# Patient Record
Sex: FEMALE | Race: WHITE | Employment: PART TIME | URBAN - METROPOLITAN AREA
[De-identification: names, ages, dates, MRNs, and addresses within clinical notes are randomized per-mention and may not be internally consistent; named-entity substitution may affect disease eponyms.]

---

## 2023-04-21 DIAGNOSIS — N91.2 AMENORRHEA: Primary | ICD-10-CM

## 2023-05-02 ENCOUNTER — INITIAL PRENATAL (OUTPATIENT)
Dept: OBGYN CLINIC | Age: 33
End: 2023-05-02
Payer: COMMERCIAL

## 2023-05-02 ENCOUNTER — ANCILLARY PROCEDURE (OUTPATIENT)
Dept: OBGYN CLINIC | Age: 33
End: 2023-05-02
Payer: COMMERCIAL

## 2023-05-02 VITALS — HEART RATE: 91 BPM | DIASTOLIC BLOOD PRESSURE: 85 MMHG | WEIGHT: 236 LBS | SYSTOLIC BLOOD PRESSURE: 121 MMHG

## 2023-05-02 DIAGNOSIS — N91.4 SECONDARY OLIGOMENORRHEA: Primary | ICD-10-CM

## 2023-05-02 PROCEDURE — 99203 OFFICE O/P NEW LOW 30 MIN: CPT | Performed by: OBSTETRICS & GYNECOLOGY

## 2023-05-02 PROCEDURE — 76801 OB US < 14 WKS SINGLE FETUS: CPT | Performed by: OBSTETRICS & GYNECOLOGY

## 2023-05-04 ENCOUNTER — INITIAL PRENATAL (OUTPATIENT)
Dept: OBGYN CLINIC | Age: 33
End: 2023-05-04
Payer: COMMERCIAL

## 2023-05-04 VITALS — HEART RATE: 99 BPM | DIASTOLIC BLOOD PRESSURE: 80 MMHG | WEIGHT: 237 LBS | SYSTOLIC BLOOD PRESSURE: 114 MMHG

## 2023-05-04 DIAGNOSIS — O09.91 SUPERVISION OF HIGH RISK PREGNANCY IN FIRST TRIMESTER: Primary | ICD-10-CM

## 2023-05-04 DIAGNOSIS — O09.91 SUPERVISION OF HIGH RISK PREGNANCY IN FIRST TRIMESTER: ICD-10-CM

## 2023-05-04 PROBLEM — E66.9 OBESITY: Status: ACTIVE | Noted: 2022-01-15

## 2023-05-04 PROBLEM — O34.219 HISTORY OF CESAREAN DELIVERY, CURRENTLY PREGNANT: Status: ACTIVE | Noted: 2018-11-20

## 2023-05-04 PROBLEM — E05.00 GRAVES' DISEASE: Status: ACTIVE | Noted: 2022-01-25

## 2023-05-04 LAB
GLUCOSE URINE, POC: NEGATIVE
PROTEIN UA: NEGATIVE

## 2023-05-04 PROCEDURE — 81002 URINALYSIS NONAUTO W/O SCOPE: CPT | Performed by: OBSTETRICS & GYNECOLOGY

## 2023-05-04 PROCEDURE — 99203 OFFICE O/P NEW LOW 30 MIN: CPT | Performed by: OBSTETRICS & GYNECOLOGY

## 2023-05-04 PROCEDURE — 0500F INITIAL PRENATAL CARE VISIT: CPT | Performed by: OBSTETRICS & GYNECOLOGY

## 2023-05-04 NOTE — PATIENT INSTRUCTIONS
Please arrive for your scheduled appointment at least 15 minutes early with your actual insurance card+ a photo ID. Also if you need any refills ordered or have questions, it may take up 48 hours to reply. Please allow ample time for your refills. Call me when you use last refill. Thank you for your cooperation. You might be having an NST at your next appt. Please eat a large snack or breakfast before coming to office. Thank you Call your primary obstetrician with bleeding, leaking of fluid, abdominal tenderness, headache, blurry vision, epigastric pain and increased urinary frequency. If you are experiencing an emergency and need immediate help, call 911 or go to go emergency room or labor and delivery. If you are experiencing an emergency and need immediate help, call 911 or go to go emergency room or labor and delivery.

## 2023-05-04 NOTE — PROGRESS NOTES
Nikita Corral is a 77-year-old G2, P2 female with 2 previous C-sections for LGA and gestational diabetes. Recently treated for hypothyroidism has been off meds for 4 months thyroid levels have been normal.  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 FOB is involved and she feels safe with him. Up-to-date on her Pap. Reports gestational hypertension at the end of pregnancy. Questions whether or not she can have a vaginal delivery. Declined NIPT  Declined nuchal  Initial prenatal labs with TSH level. And GTT. Low-dose aspirin at 12 weeks  20-week consult with MFM  Reviewed 2 previous C-sections would recommend repeat  Reviewed first and second trimester changes  Meds, nausea, foods and warning sign brochure given.

## 2023-05-04 NOTE — PROGRESS NOTES
Initial ob  No fm    Pt denies lof vag bleeding or contractions  Pt had last pap 1/2023 through [facility].

## 2023-05-08 LAB
C TRACH DNA SPEC QL NAA+PROBE: NEGATIVE
N GONORRHOEA DNA SPEC QL NAA+PROBE: NEGATIVE
SPECIMEN SOURCE: NORMAL

## 2023-06-08 ENCOUNTER — ROUTINE PRENATAL (OUTPATIENT)
Dept: OBGYN CLINIC | Age: 33
End: 2023-06-08
Payer: COMMERCIAL

## 2023-06-08 VITALS
WEIGHT: 245.2 LBS | DIASTOLIC BLOOD PRESSURE: 91 MMHG | BODY MASS INDEX: 40.85 KG/M2 | HEIGHT: 65 IN | HEART RATE: 109 BPM | SYSTOLIC BLOOD PRESSURE: 136 MMHG

## 2023-06-08 DIAGNOSIS — O09.522 SUPERVISION OF HIGH RISK MULTIGRAVIDA IN SECOND TRIMESTER IN PATIENT 35 YEARS OR OLDER AT TIME OF DELIVERY: Primary | ICD-10-CM

## 2023-06-08 DIAGNOSIS — R03.0 ELEVATED BP WITHOUT DIAGNOSIS OF HYPERTENSION: ICD-10-CM

## 2023-06-08 LAB
GLUCOSE URINE, POC: NEGATIVE
PROTEIN UA: NEGATIVE

## 2023-06-08 PROCEDURE — 81002 URINALYSIS NONAUTO W/O SCOPE: CPT | Performed by: OBSTETRICS & GYNECOLOGY

## 2023-06-08 PROCEDURE — 99213 OFFICE O/P EST LOW 20 MIN: CPT | Performed by: OBSTETRICS & GYNECOLOGY

## 2023-06-08 RX ORDER — ALBUTEROL SULFATE 90 UG/1
AEROSOL, METERED RESPIRATORY (INHALATION)
COMMUNITY

## 2023-06-08 NOTE — PROGRESS NOTES
Patient alert and pleasant with no complaints. Here today for prenatal visit. Urine for glucose and protein obtained with negative results. Discharge instructions have been discussed with the patient. Patient advised to call our office with any questions or concerns. Voiced understanding.

## 2023-06-08 NOTE — PROGRESS NOTES
Did not get prenatal labs done yet. Will be getting them at [facility] will need to have them faxed. Quad screen ordered. Appointment with M. For history gestational diabetes and hypertension 2 previous C-sections. Initial PIH labs ordered as well. Reviewed second trimester changes.

## 2023-06-28 DIAGNOSIS — O24.419 GESTATIONAL DIABETES MELLITUS (GDM) IN SECOND TRIMESTER, GESTATIONAL DIABETES METHOD OF CONTROL UNSPECIFIED: Primary | ICD-10-CM

## 2023-06-28 RX ORDER — LANCETS 30 GAUGE
1 EACH MISCELLANEOUS 4 TIMES DAILY
Qty: 200 EACH | Refills: 0 | Status: SHIPPED | OUTPATIENT
Start: 2023-06-28

## 2023-06-28 RX ORDER — GLUCOSAMINE HCL/CHONDROITIN SU 500-400 MG
CAPSULE ORAL
Qty: 200 STRIP | Refills: 3 | Status: SHIPPED | OUTPATIENT
Start: 2023-06-28

## 2023-06-28 RX ORDER — BLOOD-GLUCOSE METER
1 KIT MISCELLANEOUS DAILY
Qty: 1 KIT | Refills: 0 | Status: SHIPPED | OUTPATIENT
Start: 2023-06-28

## 2023-07-05 ENCOUNTER — ANCILLARY PROCEDURE (OUTPATIENT)
Dept: OBGYN CLINIC | Age: 33
End: 2023-07-05
Payer: COMMERCIAL

## 2023-07-05 ENCOUNTER — ROUTINE PRENATAL (OUTPATIENT)
Dept: OBGYN CLINIC | Age: 33
End: 2023-07-05
Payer: COMMERCIAL

## 2023-07-05 VITALS
BODY MASS INDEX: 41.07 KG/M2 | DIASTOLIC BLOOD PRESSURE: 80 MMHG | SYSTOLIC BLOOD PRESSURE: 115 MMHG | WEIGHT: 243 LBS | HEART RATE: 99 BPM

## 2023-07-05 DIAGNOSIS — R03.0 ELEVATED BP WITHOUT DIAGNOSIS OF HYPERTENSION: ICD-10-CM

## 2023-07-05 DIAGNOSIS — O09.522 SUPERVISION OF HIGH RISK MULTIGRAVIDA IN SECOND TRIMESTER IN PATIENT 35 YEARS OR OLDER AT TIME OF DELIVERY: ICD-10-CM

## 2023-07-05 DIAGNOSIS — Z34.90 FOURTH PREGNANCY: ICD-10-CM

## 2023-07-05 DIAGNOSIS — O24.410 DIET CONTROLLED GESTATIONAL DIABETES MELLITUS (GDM) IN SECOND TRIMESTER: ICD-10-CM

## 2023-07-05 DIAGNOSIS — Z3A.19 19 WEEKS GESTATION OF PREGNANCY: Primary | ICD-10-CM

## 2023-07-05 LAB
GLUCOSE URINE, POC: NORMAL
PROTEIN UA: NEGATIVE

## 2023-07-05 PROCEDURE — 99213 OFFICE O/P EST LOW 20 MIN: CPT | Performed by: OBSTETRICS & GYNECOLOGY

## 2023-07-05 PROCEDURE — 81002 URINALYSIS NONAUTO W/O SCOPE: CPT | Performed by: OBSTETRICS & GYNECOLOGY

## 2023-07-05 PROCEDURE — 99999 PR OFFICE/OUTPT VISIT,PROCEDURE ONLY: CPT | Performed by: OBSTETRICS & GYNECOLOGY

## 2023-07-05 PROCEDURE — 76811 OB US DETAILED SNGL FETUS: CPT | Performed by: OBSTETRICS & GYNECOLOGY

## 2023-07-05 RX ORDER — GLUCOSAMINE HCL/CHONDROITIN SU 500-400 MG
CAPSULE ORAL
Qty: 120 STRIP | Refills: 4 | Status: SHIPPED | OUTPATIENT
Start: 2023-07-05

## 2023-07-05 RX ORDER — BLOOD-GLUCOSE METER
1 KIT MISCELLANEOUS 4 TIMES DAILY
Qty: 1 KIT | Refills: 0 | Status: SHIPPED | OUTPATIENT
Start: 2023-07-05

## 2023-07-05 RX ORDER — SYRING-NEEDL,DISP,INSUL,0.3 ML 30 GX5/16"
120 SYRINGE, EMPTY DISPOSABLE MISCELLANEOUS 4 TIMES DAILY
Qty: 100 EACH | Refills: 4 | Status: SHIPPED | OUTPATIENT
Start: 2023-07-05

## 2023-07-06 ENCOUNTER — ROUTINE PRENATAL (OUTPATIENT)
Dept: OBGYN CLINIC | Age: 33
End: 2023-07-06
Payer: COMMERCIAL

## 2023-07-06 VITALS
HEART RATE: 100 BPM | SYSTOLIC BLOOD PRESSURE: 125 MMHG | WEIGHT: 242 LBS | DIASTOLIC BLOOD PRESSURE: 84 MMHG | BODY MASS INDEX: 40.9 KG/M2

## 2023-07-06 DIAGNOSIS — O09.92 ENCOUNTER FOR SUPERVISION OF HIGH RISK PREGNANCY IN SECOND TRIMESTER, ANTEPARTUM: Primary | ICD-10-CM

## 2023-07-06 LAB
GLUCOSE URINE, POC: NEGATIVE
PROTEIN UA: NEGATIVE

## 2023-07-06 PROCEDURE — 81002 URINALYSIS NONAUTO W/O SCOPE: CPT | Performed by: OBSTETRICS & GYNECOLOGY

## 2023-07-06 PROCEDURE — 99213 OFFICE O/P EST LOW 20 MIN: CPT | Performed by: OBSTETRICS & GYNECOLOGY

## 2023-07-06 NOTE — PROGRESS NOTES
Has not met yet with diabetic education but she has increased her complex carbohydrates decrease the simple carbohydrates and increase her protein. Picking up her supplies today. Saw MFM. They will continue to follow. Reports positive quickening. Labs reviewed. Plan delivery approximately 39 weeks repeat .   Will need to check TSH and T4 with 26/28-week labs secondary to history of hyperthyroidism

## 2023-07-24 ENCOUNTER — ROUTINE PRENATAL (OUTPATIENT)
Dept: OBGYN CLINIC | Age: 33
End: 2023-07-24
Payer: COMMERCIAL

## 2023-07-24 VITALS
HEART RATE: 93 BPM | SYSTOLIC BLOOD PRESSURE: 130 MMHG | BODY MASS INDEX: 41.74 KG/M2 | WEIGHT: 247 LBS | DIASTOLIC BLOOD PRESSURE: 84 MMHG

## 2023-07-24 DIAGNOSIS — O09.92 ENCOUNTER FOR SUPERVISION OF HIGH RISK PREGNANCY IN SECOND TRIMESTER, ANTEPARTUM: Primary | ICD-10-CM

## 2023-07-24 DIAGNOSIS — R03.0 ELEVATED BP WITHOUT DIAGNOSIS OF HYPERTENSION: ICD-10-CM

## 2023-07-24 DIAGNOSIS — Z3A.22 22 WEEKS GESTATION OF PREGNANCY: ICD-10-CM

## 2023-07-24 LAB
GLUCOSE URINE, POC: NEGATIVE
PROTEIN UA: POSITIVE

## 2023-07-24 PROCEDURE — 81002 URINALYSIS NONAUTO W/O SCOPE: CPT | Performed by: OBSTETRICS & GYNECOLOGY

## 2023-07-24 PROCEDURE — G8427 DOCREV CUR MEDS BY ELIG CLIN: HCPCS | Performed by: OBSTETRICS & GYNECOLOGY

## 2023-07-24 PROCEDURE — 1036F TOBACCO NON-USER: CPT | Performed by: OBSTETRICS & GYNECOLOGY

## 2023-07-24 PROCEDURE — G8419 CALC BMI OUT NRM PARAM NOF/U: HCPCS | Performed by: OBSTETRICS & GYNECOLOGY

## 2023-07-24 PROCEDURE — 99213 OFFICE O/P EST LOW 20 MIN: CPT | Performed by: OBSTETRICS & GYNECOLOGY

## 2023-07-24 PROCEDURE — 99212 OFFICE O/P EST SF 10 MIN: CPT | Performed by: OBSTETRICS & GYNECOLOGY

## 2023-07-24 NOTE — PROGRESS NOTES
Pt here today to review sugars  Pt has no other concerns  Good fetal movement  Pt has not been logging sugars. She stated that she didn't know she had to.

## 2023-07-24 NOTE — PROGRESS NOTES
[address] [place] [address] FETAL MEDICINE  [address]  [place] [address]  Dept: [phone]  Loc: [phone]     2023    RE:  Iram Moore     : 1990   AGE: 26 y.o. Dear Dr. Mady Gant,    Thank you for allowing me to provide prenatal consultation for Iram Moore. As I'm sure you will recall, Iram Moore is a 26 y.o. [id number]  Patient's last menstrual period was 2023. Estimated Date of Delivery: 23 at 22w3d seen in our office today for the following:    REASON FOR CONSULTATION:  Diabetic management    Patient Active Problem List    Diagnosis Date Noted    22 weeks gestation of pregnancy 2023    Fourth pregnancy 2023    Diet controlled gestational diabetes mellitus (GDM) in second trimester 2023    Graves' disease 2022    Obesity 01/15/2022    History of  delivery, currently pregnant 2018       PAST HISTORY:  OB History    Para Term  AB Living   4 2 2   1 2   SAB IAB Ectopic Molar Multiple Live Births       1     2      # Outcome Date GA Lbr Ben/2nd Weight Sex Delivery Anes PTL Lv   4 Current            3 Term 19 37w0d  10 lb 15 oz (4.961 kg) M CS-LTranv Spinal  JUAN JOSE   2 Term 11 39w1d  10 lb 3 oz (4.621 kg) F CS-Unspec Spinal  JUAN JOSE   1 Ectopic 08    U              MEDICAL:  Past Medical History:   Diagnosis Date    History of hyperthyroidism     History of pregnancy induced hypertension     hx ofGestational diabetes     Hypertension     Hyperthyroidism     Overactive bladder         SURGICAL:  Past Surgical History:   Procedure Laterality Date     SECTION      X2    DILATION AND CURETTAGE      ECTOPIC PREGNANCY SURGERY         ALLERGIES:    Allergies   Allergen Reactions    Bee Venom Swelling     Severe localized swelling. Severe localized swelling. Gluten     Hornet Venom Swelling     Severe localized swelling.

## 2023-07-24 NOTE — PATIENT INSTRUCTIONS
Please arrive for your scheduled appointment at least 15 minutes early with your actual insurance card+ a photo ID. Also if you need any refills ordered or have questions, it may take up 48 hours to reply. Please allow ample time for your refills. Call me when you use last refill. Thank you for your cooperation. You might be having an NST at your next appt. Please eat a large snack or breakfast before coming to office. Thank you If you are experiencing an emergency and need immediate help, call 911 or go to go emergency room or labor and delivery. if you are sick, not feeling well or have an infectious process going on please reschedule your appointment by calling [phone]. Also if any family members are not feeling well, please do not bring them to your appointment. We appreciate your cooperation. We are doing this in order to protect our pregnant mothers+ their babies. if you are sick, not feeling well or have an infectious process going on please reschedule your appointment by calling [phone]. Also if any family members are not feeling well, please do not bring them to your appointment. We appreciate your cooperation. We are doing this in order to protect our pregnant mothers+ their babies.

## 2023-08-01 ENCOUNTER — TELEPHONE (OUTPATIENT)
Dept: OBGYN | Age: 33
End: 2023-08-01

## 2023-08-01 NOTE — TELEPHONE ENCOUNTER
----- Message from Torin Tariq DO sent at 2023  1:13 PM EDT -----  Regarding: Diabetic education  Can you please let her know that it is extremely important that she has an appointment with diabetic education.   I received a message saying she canceled her appointment and did not want to reschedule at her last visit we reviewed the risks of uncontrolled gestational diabetes including stillbirth which babies and excessive fluid which can lead to  labor  ----- Message -----  From: Ashley De La Rosa RD, LD  Sent: 2023  12:19 PM EDT  To: Torin Tariq DO  Subject: Diabetes Management Program

## 2023-08-01 NOTE — TELEPHONE ENCOUNTER
Called pt to inform her of dr. Kumar Ann concerns if she does not rs her appointment with diabetic education. Lvm for pt to call our office.

## 2023-08-02 ENCOUNTER — ROUTINE PRENATAL (OUTPATIENT)
Dept: OBGYN CLINIC | Age: 33
End: 2023-08-02
Payer: COMMERCIAL

## 2023-08-02 ENCOUNTER — ANCILLARY PROCEDURE (OUTPATIENT)
Dept: OBGYN CLINIC | Age: 33
End: 2023-08-02
Payer: COMMERCIAL

## 2023-08-02 VITALS
WEIGHT: 250.44 LBS | SYSTOLIC BLOOD PRESSURE: 116 MMHG | BODY MASS INDEX: 42.32 KG/M2 | HEART RATE: 91 BPM | DIASTOLIC BLOOD PRESSURE: 80 MMHG

## 2023-08-02 DIAGNOSIS — O24.410 DIET CONTROLLED GESTATIONAL DIABETES MELLITUS (GDM) IN SECOND TRIMESTER: Primary | ICD-10-CM

## 2023-08-02 DIAGNOSIS — Z3A.25 25 WEEKS GESTATION OF PREGNANCY: ICD-10-CM

## 2023-08-02 LAB
GLUCOSE URINE, POC: NEGATIVE
PROTEIN UA: NEGATIVE

## 2023-08-02 PROCEDURE — 81002 URINALYSIS NONAUTO W/O SCOPE: CPT | Performed by: OBSTETRICS & GYNECOLOGY

## 2023-08-02 PROCEDURE — 99213 OFFICE O/P EST LOW 20 MIN: CPT | Performed by: OBSTETRICS & GYNECOLOGY

## 2023-08-02 PROCEDURE — 76816 OB US FOLLOW-UP PER FETUS: CPT | Performed by: OBSTETRICS & GYNECOLOGY

## 2023-08-02 PROCEDURE — G8427 DOCREV CUR MEDS BY ELIG CLIN: HCPCS | Performed by: OBSTETRICS & GYNECOLOGY

## 2023-08-02 PROCEDURE — 1036F TOBACCO NON-USER: CPT | Performed by: OBSTETRICS & GYNECOLOGY

## 2023-08-02 PROCEDURE — G8419 CALC BMI OUT NRM PARAM NOF/U: HCPCS | Performed by: OBSTETRICS & GYNECOLOGY

## 2023-08-02 NOTE — PATIENT INSTRUCTIONS
Please arrive for your scheduled appointment at least 15 minutes early with your actual insurance card+ a photo ID. Also if you need any refills ordered or have questions, it may take up 48 hours to reply. Please allow ample time for your refills. Call me when you use last refill. Thank you for your cooperation. Call your primary obstetrician with bleeding, leaking of fluid, abdominal tenderness, headache, blurry vision, epigastric pain and increased urinary frequency. If you are experiencing an emergency and need immediate help, call 911 or go to go emergency room or labor and delivery. if you are sick, not feeling well or have an infectious process going on please reschedule your appointment by calling [phone]. Also if any family members are not feeling well, please do not bring them to your appointment. We appreciate your cooperation. We are doing this in order to protect our pregnant mothers+ their babies. if you are sick, not feeling well or have an infectious process going on please reschedule your appointment by calling [phone]. Also if any family members are not feeling well, please do not bring them to your appointment. We appreciate your cooperation. We are doing this in order to protect our pregnant mothers+ their babies.

## 2023-08-02 NOTE — PROGRESS NOTES
Pt here today to go over her blood sugars  Pt blood sugar log uploaded into media  Denies bleeding/crampimg/lof  Good fetal movement

## 2023-08-02 NOTE — PROGRESS NOTES
[address]  FETAL MEDICINE  [address] [address]  Dept: [phone]  Loc: [phone]     2023    RE:  Nicholas Walter     : 1990   AGE: 26 y.o. Dear Dr. Kendall Yang,    Thank you for allowing me to provide prenatal consultation for Nicholas Walter. As I'm sure you will recall, Nicholas Walter is a 26 y.o. [id number]  Patient's last menstrual period was 2023. Estimated Date of Delivery: 23 at 23w5d seen in our office today for the following:    REASON FOR CONSULTATION:  Diabetic management  Growth ultrasound    Patient Active Problem List    Diagnosis Date Noted    25 weeks gestation of pregnancy 2023    Fourth pregnancy 2023    Diet controlled gestational diabetes mellitus (GDM) in second trimester 2023    Graves' disease 2022    Obesity 01/15/2022    History of  delivery, currently pregnant 2018       PAST HISTORY:  OB History    Para Term  AB Living   4 2 2   1 2   SAB IAB Ectopic Molar Multiple Live Births       1     2      # Outcome Date GA Lbr Ben/2nd Weight Sex Delivery Anes PTL Lv   4 Current            3 Term 19 37w0d  10 lb 15 oz (4.961 kg) M CS-LTranv Spinal  JUAN JOSE   2 Term 11 39w1d  10 lb 3 oz (4.621 kg) F CS-Unspec Spinal  JUAN JOSE   1 Ectopic 08    U              MEDICAL:  Past Medical History:   Diagnosis Date    History of hyperthyroidism     History of pregnancy induced hypertension     hx ofGestational diabetes     Hypertension     Hyperthyroidism     Overactive bladder         SURGICAL:  Past Surgical History:   Procedure Laterality Date     SECTION      X2    DILATION AND CURETTAGE      ECTOPIC PREGNANCY SURGERY         ALLERGIES:    Allergies   Allergen Reactions    Bee Venom Swelling     Severe localized swelling. Severe localized swelling. Gluten     Hornet Venom Swelling     Severe localized swelling.

## 2023-08-17 ENCOUNTER — ROUTINE PRENATAL (OUTPATIENT)
Dept: OBGYN CLINIC | Age: 33
End: 2023-08-17
Payer: COMMERCIAL

## 2023-08-17 VITALS
WEIGHT: 250.2 LBS | HEART RATE: 99 BPM | BODY MASS INDEX: 42.28 KG/M2 | DIASTOLIC BLOOD PRESSURE: 75 MMHG | SYSTOLIC BLOOD PRESSURE: 119 MMHG

## 2023-08-17 DIAGNOSIS — O09.92 SUPERVISION OF HIGH RISK PREGNANCY IN SECOND TRIMESTER: Primary | ICD-10-CM

## 2023-08-17 LAB
GLUCOSE URINE, POC: NEGATIVE
PROTEIN UA: NEGATIVE

## 2023-08-17 PROCEDURE — 81002 URINALYSIS NONAUTO W/O SCOPE: CPT | Performed by: OBSTETRICS & GYNECOLOGY

## 2023-08-17 PROCEDURE — 99213 OFFICE O/P EST LOW 20 MIN: CPT | Performed by: OBSTETRICS & GYNECOLOGY

## 2023-08-17 NOTE — PROGRESS NOTES
Good FM. No BLD or LOF. Blood sugars are good. 26/28-week labs ordered. Tdap next visit. Continues to follow with MFM.

## 2023-08-17 NOTE — PROGRESS NOTES
Routine ob  +fm  Pt denies lof vag bleeding or contractions  Urine protein neg/sugar neg  Sometimes litzy agrzon

## 2023-08-30 ENCOUNTER — ANCILLARY PROCEDURE (OUTPATIENT)
Dept: OBGYN CLINIC | Age: 33
End: 2023-08-30
Payer: COMMERCIAL

## 2023-08-30 ENCOUNTER — ROUTINE PRENATAL (OUTPATIENT)
Dept: OBGYN CLINIC | Age: 33
End: 2023-08-30
Payer: COMMERCIAL

## 2023-08-30 VITALS
BODY MASS INDEX: 42.93 KG/M2 | WEIGHT: 254 LBS | DIASTOLIC BLOOD PRESSURE: 75 MMHG | SYSTOLIC BLOOD PRESSURE: 113 MMHG | HEART RATE: 102 BPM

## 2023-08-30 DIAGNOSIS — O24.410 DIET CONTROLLED GESTATIONAL DIABETES MELLITUS (GDM) IN SECOND TRIMESTER: ICD-10-CM

## 2023-08-30 DIAGNOSIS — Z3A.27 27 WEEKS GESTATION OF PREGNANCY: Primary | ICD-10-CM

## 2023-08-30 LAB
GLUCOSE URINE, POC: NORMAL
PROTEIN UA: NEGATIVE

## 2023-08-30 PROCEDURE — 76816 OB US FOLLOW-UP PER FETUS: CPT | Performed by: OBSTETRICS & GYNECOLOGY

## 2023-08-30 PROCEDURE — 99213 OFFICE O/P EST LOW 20 MIN: CPT | Performed by: OBSTETRICS & GYNECOLOGY

## 2023-08-30 PROCEDURE — 81002 URINALYSIS NONAUTO W/O SCOPE: CPT | Performed by: OBSTETRICS & GYNECOLOGY

## 2023-08-30 NOTE — PROGRESS NOTES
Patient is here today for 4 week f/u. Denies any vaginal bleeding, cramping, or leakage of fluids. Patient reports good fetal movement.

## 2023-09-14 ENCOUNTER — ROUTINE PRENATAL (OUTPATIENT)
Age: 33
End: 2023-09-14

## 2023-09-14 VITALS
HEART RATE: 104 BPM | BODY MASS INDEX: 43.09 KG/M2 | WEIGHT: 255 LBS | DIASTOLIC BLOOD PRESSURE: 75 MMHG | SYSTOLIC BLOOD PRESSURE: 111 MMHG

## 2023-09-14 DIAGNOSIS — O09.529 ENCOUNTER FOR SUPERVISION OF HIGH-RISK PREGNANCY WITH MULTIGRAVIDA OF ADVANCED MATERNAL AGE: Primary | ICD-10-CM

## 2023-09-14 LAB
GLUCOSE URINE, POC: NORMAL
PROTEIN UA: NEGATIVE

## 2023-09-14 NOTE — PROGRESS NOTES
Reports some blood sugars over 100 fasting. 2-hour postprandial under 130. No BLD, LOF occasional CTN's. Good FM. No SS PIH. Continues to meet with [facility]. Declined Tdap and flu shot will let us know if she changes her mind. Will schedule  after her 36-week appointment.

## 2023-09-14 NOTE — PROGRESS NOTES
Patient is here today for 3 wk f/u. Denies any vaginal bleeding, cramping, or leakage of fluids. Patient had some right sided discomfort yesterday. Patient reports good fetal movement.

## 2023-09-27 ENCOUNTER — ANCILLARY PROCEDURE (OUTPATIENT)
Dept: OBGYN CLINIC | Age: 33
End: 2023-09-27
Payer: COMMERCIAL

## 2023-09-27 ENCOUNTER — ROUTINE PRENATAL (OUTPATIENT)
Dept: OBGYN CLINIC | Age: 33
End: 2023-09-27
Payer: COMMERCIAL

## 2023-09-27 VITALS
WEIGHT: 261 LBS | DIASTOLIC BLOOD PRESSURE: 73 MMHG | BODY MASS INDEX: 44.11 KG/M2 | SYSTOLIC BLOOD PRESSURE: 111 MMHG | HEART RATE: 99 BPM

## 2023-09-27 DIAGNOSIS — O24.410 DIET CONTROLLED GESTATIONAL DIABETES MELLITUS (GDM) IN SECOND TRIMESTER: ICD-10-CM

## 2023-09-27 DIAGNOSIS — Z3A.31 31 WEEKS GESTATION OF PREGNANCY: Primary | ICD-10-CM

## 2023-09-27 PROCEDURE — 76820 UMBILICAL ARTERY ECHO: CPT | Performed by: OBSTETRICS & GYNECOLOGY

## 2023-09-27 PROCEDURE — 76819 FETAL BIOPHYS PROFIL W/O NST: CPT | Performed by: OBSTETRICS & GYNECOLOGY

## 2023-09-27 PROCEDURE — 99213 OFFICE O/P EST LOW 20 MIN: CPT | Performed by: OBSTETRICS & GYNECOLOGY

## 2023-09-27 PROCEDURE — 76821 MIDDLE CEREBRAL ARTERY ECHO: CPT | Performed by: OBSTETRICS & GYNECOLOGY

## 2023-09-27 PROCEDURE — 76816 OB US FOLLOW-UP PER FETUS: CPT | Performed by: OBSTETRICS & GYNECOLOGY

## 2023-09-28 ENCOUNTER — ROUTINE PRENATAL (OUTPATIENT)
Age: 33
End: 2023-09-28
Payer: COMMERCIAL

## 2023-09-28 VITALS
SYSTOLIC BLOOD PRESSURE: 114 MMHG | BODY MASS INDEX: 44.11 KG/M2 | WEIGHT: 261 LBS | HEART RATE: 90 BPM | DIASTOLIC BLOOD PRESSURE: 72 MMHG

## 2023-09-28 DIAGNOSIS — O09.529 ENCOUNTER FOR SUPERVISION OF HIGH-RISK PREGNANCY WITH MULTIGRAVIDA OF ADVANCED MATERNAL AGE: Primary | ICD-10-CM

## 2023-09-28 PROCEDURE — 81002 URINALYSIS NONAUTO W/O SCOPE: CPT | Performed by: OBSTETRICS & GYNECOLOGY

## 2023-09-28 PROCEDURE — 0502F SUBSEQUENT PRENATAL CARE: CPT | Performed by: OBSTETRICS & GYNECOLOGY

## 2023-09-28 NOTE — PROGRESS NOTES
Good FM. No BLD or LOF. Saw MFM yesterday appropriate growth. Reviewed labs on her phone from [facility] all normal hemoglobin A1c 5.4 thyroid normal, ferritin slightly low asked her to start iron with vitamin C q. OD. Will schedule  at 36 weeks for 38/39 weeks.

## 2023-09-28 NOTE — PROGRESS NOTES
Patient is here today for prenatal visit. Denies any vaginal bleeding, cramping, or leakage of fluids. Patient reports good fetal movement.

## 2023-10-11 ENCOUNTER — ROUTINE PRENATAL (OUTPATIENT)
Dept: OBGYN CLINIC | Age: 33
End: 2023-10-11
Payer: COMMERCIAL

## 2023-10-11 ENCOUNTER — ANCILLARY PROCEDURE (OUTPATIENT)
Dept: OBGYN CLINIC | Age: 33
End: 2023-10-11
Payer: COMMERCIAL

## 2023-10-11 VITALS
SYSTOLIC BLOOD PRESSURE: 107 MMHG | HEART RATE: 93 BPM | WEIGHT: 258 LBS | BODY MASS INDEX: 43.6 KG/M2 | DIASTOLIC BLOOD PRESSURE: 73 MMHG

## 2023-10-11 DIAGNOSIS — O24.410 DIET CONTROLLED GESTATIONAL DIABETES MELLITUS (GDM) IN SECOND TRIMESTER: ICD-10-CM

## 2023-10-11 DIAGNOSIS — Z3A.33 33 WEEKS GESTATION OF PREGNANCY: Primary | ICD-10-CM

## 2023-10-11 DIAGNOSIS — O24.415 GESTATIONAL DIABETES MELLITUS (GDM) IN SECOND TRIMESTER CONTROLLED ON ORAL HYPOGLYCEMIC DRUG: ICD-10-CM

## 2023-10-11 LAB
GLUCOSE URINE, POC: NORMAL
PROTEIN UA: NEGATIVE

## 2023-10-11 PROCEDURE — 76818 FETAL BIOPHYS PROFILE W/NST: CPT | Performed by: OBSTETRICS & GYNECOLOGY

## 2023-10-11 PROCEDURE — 99213 OFFICE O/P EST LOW 20 MIN: CPT | Performed by: OBSTETRICS & GYNECOLOGY

## 2023-10-11 PROCEDURE — 81002 URINALYSIS NONAUTO W/O SCOPE: CPT | Performed by: OBSTETRICS & GYNECOLOGY

## 2023-10-11 RX ORDER — METFORMIN HYDROCHLORIDE 500 MG/1
500 TABLET, EXTENDED RELEASE ORAL EVERY EVENING
Qty: 30 TABLET | Refills: 2 | Status: SHIPPED | OUTPATIENT
Start: 2023-10-11

## 2023-10-12 ENCOUNTER — ROUTINE PRENATAL (OUTPATIENT)
Age: 33
End: 2023-10-12

## 2023-10-12 VITALS
HEART RATE: 89 BPM | BODY MASS INDEX: 43.53 KG/M2 | SYSTOLIC BLOOD PRESSURE: 111 MMHG | DIASTOLIC BLOOD PRESSURE: 73 MMHG | WEIGHT: 257.6 LBS

## 2023-10-12 DIAGNOSIS — O09.93 SUPERVISION OF HIGH RISK PREGNANCY IN THIRD TRIMESTER: Primary | ICD-10-CM

## 2023-10-12 LAB
GLUCOSE URINE, POC: NEGATIVE
PROTEIN UA: NEGATIVE

## 2023-10-12 SDOH — ECONOMIC STABILITY: FOOD INSECURITY: WITHIN THE PAST 12 MONTHS, THE FOOD YOU BOUGHT JUST DIDN'T LAST AND YOU DIDN'T HAVE MONEY TO GET MORE.: NEVER TRUE

## 2023-10-12 SDOH — ECONOMIC STABILITY: FOOD INSECURITY: WITHIN THE PAST 12 MONTHS, YOU WORRIED THAT YOUR FOOD WOULD RUN OUT BEFORE YOU GOT MONEY TO BUY MORE.: NEVER TRUE

## 2023-10-12 SDOH — ECONOMIC STABILITY: INCOME INSECURITY: HOW HARD IS IT FOR YOU TO PAY FOR THE VERY BASICS LIKE FOOD, HOUSING, MEDICAL CARE, AND HEATING?: NOT HARD AT ALL

## 2023-10-12 SDOH — ECONOMIC STABILITY: HOUSING INSECURITY
IN THE LAST 12 MONTHS, WAS THERE A TIME WHEN YOU DID NOT HAVE A STEADY PLACE TO SLEEP OR SLEPT IN A SHELTER (INCLUDING NOW)?: NO

## 2023-10-12 ASSESSMENT — PATIENT HEALTH QUESTIONNAIRE - PHQ9
SUM OF ALL RESPONSES TO PHQ9 QUESTIONS 1 & 2: 0
SUM OF ALL RESPONSES TO PHQ QUESTIONS 1-9: 0
1. LITTLE INTEREST OR PLEASURE IN DOING THINGS: 0
2. FEELING DOWN, DEPRESSED OR HOPELESS: 0
SUM OF ALL RESPONSES TO PHQ QUESTIONS 1-9: 0

## 2023-10-12 NOTE — PROGRESS NOTES
Good FM. No BLD or LOF. Saw MFM yesterday. Declined Tdap and flu shot. [facility] recommends delivery between 45 and 39 weeks we will schedule  after next visit.

## 2023-10-17 ENCOUNTER — ANCILLARY PROCEDURE (OUTPATIENT)
Dept: OBGYN CLINIC | Age: 33
End: 2023-10-17
Payer: COMMERCIAL

## 2023-10-17 ENCOUNTER — ROUTINE PRENATAL (OUTPATIENT)
Dept: OBGYN CLINIC | Age: 33
End: 2023-10-17
Payer: COMMERCIAL

## 2023-10-17 VITALS
BODY MASS INDEX: 43.43 KG/M2 | DIASTOLIC BLOOD PRESSURE: 71 MMHG | SYSTOLIC BLOOD PRESSURE: 110 MMHG | HEART RATE: 88 BPM | WEIGHT: 257 LBS

## 2023-10-17 DIAGNOSIS — O24.410 DIET CONTROLLED GESTATIONAL DIABETES MELLITUS (GDM) IN THIRD TRIMESTER: ICD-10-CM

## 2023-10-17 DIAGNOSIS — Z3A.34 34 WEEKS GESTATION OF PREGNANCY: Primary | ICD-10-CM

## 2023-10-17 PROCEDURE — 99213 OFFICE O/P EST LOW 20 MIN: CPT | Performed by: OBSTETRICS & GYNECOLOGY

## 2023-10-17 PROCEDURE — 76818 FETAL BIOPHYS PROFILE W/NST: CPT | Performed by: OBSTETRICS & GYNECOLOGY

## 2023-10-17 PROCEDURE — 81002 URINALYSIS NONAUTO W/O SCOPE: CPT | Performed by: OBSTETRICS & GYNECOLOGY

## 2023-10-17 PROCEDURE — 99999 PR OFFICE/OUTPT VISIT,PROCEDURE ONLY: CPT | Performed by: OBSTETRICS & GYNECOLOGY

## 2023-10-17 NOTE — PROGRESS NOTES
[facility]   [address] FETAL MEDICINE  [address] [address]  Dept: [phone]  Loc: [phone]     10/17/2023    RE:  Hayley Portillo     : 1990   AGE: 26 y.o. Dear Dr. Evangelina Barnett,    Thank you for allowing me to see Hayley Portillo. As I'm sure you will recall, Hayley Portillo is a 26 y.o. [id number]'s last menstrual period was 2023. Estimated Date of Delivery: 23 at 34w4d seen in our office today for the following:    REASON FOR VISIT: BPP and NST    Patient Active Problem List    Diagnosis Date Noted    34 weeks gestation of pregnancy 2023    Fourth pregnancy 2023    Gestational diabetes mellitus (GDM) in second trimester controlled on oral hypoglycemic drug 2023    Graves' disease 2022    Obesity 01/15/2022    History of  delivery, currently pregnant 2018        PAST HISTORY:  OB History    Para Term  AB Living   4 2 2   1 2   SAB IAB Ectopic Molar Multiple Live Births       1     2      # Outcome Date GA Lbr Ben/2nd Weight Sex Delivery Anes PTL Lv   4 Current            3 Term 19 37w0d  10 lb 15 oz (4.961 kg) M CS-LTranv Spinal  JUAN JOSE   2 Term 11 39w1d  10 lb 3 oz (4.621 kg) F CS-Unspec Spinal  JUAN JOSE   1 Ectopic 08    U              MEDICAL:  Past Medical History:   Diagnosis Date    History of pregnancy induced hypertension     hx ofGestational diabetes     Hypertension     Hyperthyroidism         SURGICAL:  Past Surgical History:   Procedure Laterality Date     SECTION      X2    DILATION AND CURETTAGE      ECTOPIC PREGNANCY SURGERY         ALLERGIES:    Allergies   Allergen Reactions    Bee Venom Swelling     Severe localized swelling. Severe localized swelling. Gluten     Hornet Venom Swelling     Severe localized swelling.          MEDICATIONS:    Current Outpatient Medications   Medication Sig

## 2023-10-21 ENCOUNTER — HOSPITAL ENCOUNTER (OUTPATIENT)
Age: 33
Discharge: HOME | End: 2023-10-21
Attending: OBSTETRICS & GYNECOLOGY | Admitting: OBSTETRICS & GYNECOLOGY
Payer: COMMERCIAL

## 2023-10-21 VITALS
TEMPERATURE: 98.8 F | HEART RATE: 104 BPM | RESPIRATION RATE: 16 BRPM | SYSTOLIC BLOOD PRESSURE: 129 MMHG | DIASTOLIC BLOOD PRESSURE: 81 MMHG

## 2023-10-21 LAB
BILIRUB UR QL STRIP: NEGATIVE
CLARITY UR: CLEAR
COLOR UR: YELLOW
GLUCOSE UR STRIP-MCNC: NEGATIVE MG/DL
HGB UR QL STRIP.AUTO: NEGATIVE
KETONES UR STRIP-MCNC: >80 MG/DL
LEUKOCYTE ESTERASE UR QL STRIP: NEGATIVE
NITRITE UR QL STRIP: NEGATIVE
PH UR STRIP: 6 [PH] (ref 5–9)
PROT UR STRIP-MCNC: NEGATIVE MG/DL
RBC #/AREA URNS HPF: ABNORMAL /HPF
SP GR UR STRIP: 1.02 (ref 1–1.03)
UROBILINOGEN UR STRIP-ACNC: 0.2 EU/DL (ref 0–1)
WBC #/AREA URNS HPF: ABNORMAL /HPF

## 2023-10-21 PROCEDURE — 99211 OFF/OP EST MAY X REQ PHY/QHP: CPT

## 2023-10-21 PROCEDURE — 96361 HYDRATE IV INFUSION ADD-ON: CPT

## 2023-10-21 PROCEDURE — 81001 URINALYSIS AUTO W/SCOPE: CPT

## 2023-10-21 PROCEDURE — 2580000003 HC RX 258: Performed by: OBSTETRICS & GYNECOLOGY

## 2023-10-21 PROCEDURE — 96360 HYDRATION IV INFUSION INIT: CPT

## 2023-10-21 PROCEDURE — 59025 FETAL NON-STRESS TEST: CPT

## 2023-10-21 RX ORDER — SODIUM CHLORIDE, SODIUM LACTATE, POTASSIUM CHLORIDE, CALCIUM CHLORIDE 600; 310; 30; 20 MG/100ML; MG/100ML; MG/100ML; MG/100ML
INJECTION, SOLUTION INTRAVENOUS CONTINUOUS
Status: DISCONTINUED | OUTPATIENT
Start: 2023-10-21 | End: 2023-10-21 | Stop reason: HOSPADM

## 2023-10-21 RX ADMIN — SODIUM CHLORIDE, POTASSIUM CHLORIDE, SODIUM LACTATE AND CALCIUM CHLORIDE: 600; 310; 30; 20 INJECTION, SOLUTION INTRAVENOUS at 13:21

## 2023-10-21 NOTE — PROGRESS NOTES
At patient's bedside adjusting EFM monitors constantly d/t patient's body habitus. EFM reading maternal 1222 to 1312. RN at bedside adjusting EFM at that time.

## 2023-10-21 NOTE — DISCHARGE INSTRUCTIONS
Home Undelivered Discharge Instructions    After Discharge Orders:    Future Appointments   Date Time Provider [address]   10/24/2023 10:30 AM [address] [facility]   10/26/2023 10:40 AM Nicola Shaw DO [facility]   10/31/2023  2:40 PM Nicola Shaw DO [facility]   11/9/2023 10:00 AM Hanigosky, Helyn Blizzard, DO [facility]                     Diet:  normal diet as tolerated    Rest: normal activity as tolerated    Other instructions: Do kick counts once a day on your baby. Choose the time of day your baby is most active. Get in a comfortable lying or sitting position and time how long it takes to feel 10 kicks, twists, turns, swishes, or rolls.  Call your physician or midwife if there have not been 10 kicks in 2 hours    Call physician or midwife, return to Labor and Delivery, call 911, or go to the nearest Emergency Room if: increased leakage or fluid, contractions more than  6 per  1 hour, decreased fetal movement, persistent low back pain or cramping, or bleeding from vaginal area

## 2023-10-21 NOTE — PROGRESS NOTES
Patient of the [address] [place] [address] 35w1d presents to L&D with c/o CTXs. Denies any VB or LOF. +FM. EFM applied and call light within reach.

## 2023-10-21 NOTE — H&P
Department of Obstetrics and Gynecology  Attending Obstetrics History and Physical      HISTORY OF PRESENT ILLNESS:      The patient is a 26 y.o.  4 parity 2 at 35 weeks 1 days. Complaining of ctx's. Two prior  sections. Estimated Due Date:  23  Contractions:  Yes  Leaking of fluid: no  nfm  Blleeding:  No    PRENATAL CARE:    Complications: No      Active Problems:    * No active hospital problems. *  Resolved Problems:    * No resolved hospital problems. *        PAST OB HISTORY    OB History    Para Term  AB Living   4 2 2   1 2   SAB IAB Ectopic Molar Multiple Live Births       1     2      # Outcome Date GA Lbr Ben/2nd Weight Sex Delivery Anes PTL Lv   4 Current            3 Term 19 37w0d  4.961 kg (10 lb 15 oz) M CS-LTranv Spinal  JUAN JOSE   2 Term 11 39w1d  4.621 kg (10 lb 3 oz) F CS-Unspec Spinal  JUAN JOSE   1 Ectopic 08    U               Pre-eclampsia:  No      :  Yes       D & C:  No      Cerclage:  No      LEEP:  No      Myomectomy:  No       Labor: No    Past Medical History:    Past Medical History:   Diagnosis Date    History of pregnancy induced hypertension     hx ofGestational diabetes     Hypertension     Hyperthyroidism         Past Surgical History:    Past Surgical History:   Procedure Laterality Date     SECTION      X2    DILATION AND CURETTAGE      ECTOPIC PREGNANCY SURGERY          Past Family History:  Family History   Problem Relation Age of Onset    Diabetes Father     Cancer Maternal Grandfather     Heart Attack Maternal Grandfather     Heart Attack Paternal Grandfather        ROS:  Const: No fever, chills, night sweats, no recent unexplained weight gain/loss  HEENT: No blurred vision, double vision; no ear problems; no sore throat, congestion; no running nose.   Resp: No cough, no sputum, no pleuritic chest pain, no sob  Cardio: No chest pain, no exertional dyspnea, no PND, no orthopnea, no palpitation, no leg swelling. GI: No dysphagia, no reflux; no abdominal pain, no n/v; no c/d. No hematochezia    : No dysuria, no frequency, hesitancy; no hematuria  MSK: no joint pain, no myalgia, no change in ROM  Neuro: no focal weakness in extremities, no slurred speech, no double vision, no numbness or tingling in extremities  Endo: no heat/cold intolerance, no polyphagia, polydipsia or polyuria  Hem: no increased bleeding, no bruising, no lymphadenopathy  Skin: no skin changes  Psych: no depressed mood, no suicidal ideation    Social History:     reports that she has quit smoking. Her smoking use included cigarettes. She has quit using smokeless tobacco. She reports that she does not currently use alcohol. She reports that she does not use drugs. Medications Prior to Admission:  Medications Prior to Admission: metFORMIN (GLUCOPHAGE-XR) 500 MG extended release tablet, Take 1 tablet by mouth every evening  glucose monitoring (FREESTYLE FREEDOM) kit, 1 kit by Does not apply route 4 times daily  Lancets ([employer] ULTRA THIN AUTO LANCET) MISC, 120 each by Does not apply route in the morning, at noon, in the evening, and at bedtime  blood glucose monitor strips, Test 4 times a day & as needed for symptoms of irregular blood glucose. Dispense sufficient amount for indicated testing frequency plus additional to accommodate PRN testing needs. blood glucose monitor strips, Test 4 times a day & as needed for symptoms of irregular blood glucose. Dispense sufficient amount for indicated testing frequency plus additional to accommodate PRN testing needs.   glucose monitoring (FREESTYLE FREEDOM) kit, 1 kit by Does not apply route daily  Lancets MISC, 1 each by Does not apply route 4 times daily  cyanocobalamin 100 MCG tablet, Take 1 tablet by mouth daily  albuterol sulfate HFA (PROVENTIL;VENTOLIN;PROAIR) 108 (90 Base) MCG/ACT inhaler, Ventolin HFA 90 mcg/actuation aerosol inhaler  Garlic Oil 9031 MG CAPS, Take by mouth  Prenatal MV-Min-Fe

## 2023-10-21 NOTE — PROGRESS NOTES
Denies ctx's,lof or vb.nfm  Wants to go home  U/a neg  Fht's reactive  Intermittent ctx's not felt  Cx closed/thick/firm/high/posterior  Discharged with precautions.  Call or return to labor and delivery if ctx's,lof, vb, decreased fm or concerns

## 2023-10-24 ENCOUNTER — ROUTINE PRENATAL (OUTPATIENT)
Dept: OBGYN CLINIC | Age: 33
End: 2023-10-24
Payer: COMMERCIAL

## 2023-10-24 ENCOUNTER — ANCILLARY PROCEDURE (OUTPATIENT)
Dept: OBGYN CLINIC | Age: 33
End: 2023-10-24
Payer: COMMERCIAL

## 2023-10-24 VITALS
HEART RATE: 101 BPM | SYSTOLIC BLOOD PRESSURE: 111 MMHG | DIASTOLIC BLOOD PRESSURE: 74 MMHG | WEIGHT: 256 LBS | BODY MASS INDEX: 43.26 KG/M2

## 2023-10-24 DIAGNOSIS — O24.410 DIET CONTROLLED GESTATIONAL DIABETES MELLITUS (GDM) IN THIRD TRIMESTER: ICD-10-CM

## 2023-10-24 DIAGNOSIS — Z3A.35 35 WEEKS GESTATION OF PREGNANCY: Primary | ICD-10-CM

## 2023-10-24 LAB
GLUCOSE URINE, POC: NORMAL
PROTEIN UA: NEGATIVE

## 2023-10-24 PROCEDURE — 81002 URINALYSIS NONAUTO W/O SCOPE: CPT | Performed by: OBSTETRICS & GYNECOLOGY

## 2023-10-24 PROCEDURE — 99213 OFFICE O/P EST LOW 20 MIN: CPT | Performed by: OBSTETRICS & GYNECOLOGY

## 2023-10-24 PROCEDURE — 76816 OB US FOLLOW-UP PER FETUS: CPT | Performed by: OBSTETRICS & GYNECOLOGY

## 2023-10-24 PROCEDURE — 76818 FETAL BIOPHYS PROFILE W/NST: CPT | Performed by: OBSTETRICS & GYNECOLOGY

## 2023-10-24 NOTE — PROGRESS NOTES
Patient is here today for bpp/nst. Denies any vaginal bleeding, or leakage of fluids. Had some cramping over the weekend and a slight spot of vaginal bleeding, went to L&D. Patient reports good fetal movement.
Refill    metFORMIN (GLUCOPHAGE-XR) 500 MG extended release tablet Take 1 tablet by mouth every evening 30 tablet 2    cyanocobalamin 100 MCG tablet Take 1 tablet by mouth daily      albuterol sulfate HFA (PROVENTIL;VENTOLIN;PROAIR) 108 (90 Base) MCG/ACT inhaler Ventolin HFA 90 mcg/actuation aerosol inhaler      Garlic Oil 1029 MG CAPS Take by mouth      Prenatal MV-Min-Fe Fum-FA-DHA (PRENATAL 1 PO) Take by mouth      glucose monitoring (FREESTYLE FREEDOM) kit 1 kit by Does not apply route 4 times daily 1 kit 0    Lancets ([employer] ULTRA THIN AUTO LANCET) MISC 120 each by Does not apply route in the morning, at noon, in the evening, and at bedtime 100 each 4    blood glucose monitor strips Test 4 times a day & as needed for symptoms of irregular blood glucose. Dispense sufficient amount for indicated testing frequency plus additional to accommodate PRN testing needs. 120 strip 4    blood glucose monitor strips Test 4 times a day & as needed for symptoms of irregular blood glucose. Dispense sufficient amount for indicated testing frequency plus additional to accommodate PRN testing needs. 200 strip 3    glucose monitoring (FREESTYLE FREEDOM) kit 1 kit by Does not apply route daily 1 kit 0    Lancets MISC 1 each by Does not apply route 4 times daily 200 each 0     No current facility-administered medications for this visit.         Social History     Socioeconomic History    Marital status:    Tobacco Use    Smoking status: Former     Years: 5     Types: Cigarettes    Smokeless tobacco: Former   Vaping Use    Vaping Use: Never used   Substance and Sexual Activity    Alcohol use: Not Currently    Drug use: Never    Sexual activity: Yes     Partners: Male     Comment:      Social Determinants of Health     Financial Resource Strain: Low Risk  (10/12/2023)    Overall Financial Resource Strain (CARDIA)     Difficulty of Paying Living Expenses: Not hard at all   Food Insecurity: No Food Insecurity (10/12/2023)

## 2023-10-26 ENCOUNTER — ROUTINE PRENATAL (OUTPATIENT)
Age: 33
End: 2023-10-26
Payer: COMMERCIAL

## 2023-10-26 VITALS
DIASTOLIC BLOOD PRESSURE: 72 MMHG | BODY MASS INDEX: 43.6 KG/M2 | SYSTOLIC BLOOD PRESSURE: 106 MMHG | WEIGHT: 258 LBS | HEART RATE: 93 BPM

## 2023-10-26 DIAGNOSIS — O09.93 ENCOUNTER FOR SUPERVISION OF HIGH RISK PREGNANCY IN THIRD TRIMESTER, ANTEPARTUM: Primary | ICD-10-CM

## 2023-10-26 DIAGNOSIS — O09.93 ENCOUNTER FOR SUPERVISION OF HIGH RISK PREGNANCY IN THIRD TRIMESTER, ANTEPARTUM: ICD-10-CM

## 2023-10-26 LAB
GLUCOSE URINE, POC: NORMAL
PROTEIN UA: NEGATIVE

## 2023-10-26 PROCEDURE — 81002 URINALYSIS NONAUTO W/O SCOPE: CPT | Performed by: OBSTETRICS & GYNECOLOGY

## 2023-10-26 PROCEDURE — 0502F SUBSEQUENT PRENATAL CARE: CPT | Performed by: OBSTETRICS & GYNECOLOGY

## 2023-10-26 NOTE — PROGRESS NOTES
Good FM. No BLD or LOF. Sugars are good. Continues to meet with MFM. Repeat LTCS scheduled for 11- at noon. GBS performed. Reviewed the  procedure including risks.

## 2023-10-30 ENCOUNTER — ROUTINE PRENATAL (OUTPATIENT)
Dept: OBGYN CLINIC | Age: 33
End: 2023-10-30
Payer: COMMERCIAL

## 2023-10-30 ENCOUNTER — ANCILLARY PROCEDURE (OUTPATIENT)
Dept: OBGYN CLINIC | Age: 33
End: 2023-10-30
Payer: COMMERCIAL

## 2023-10-30 VITALS
SYSTOLIC BLOOD PRESSURE: 117 MMHG | WEIGHT: 257 LBS | DIASTOLIC BLOOD PRESSURE: 76 MMHG | HEART RATE: 88 BPM | BODY MASS INDEX: 43.43 KG/M2

## 2023-10-30 DIAGNOSIS — O24.410 DIET CONTROLLED GESTATIONAL DIABETES MELLITUS (GDM) IN THIRD TRIMESTER: ICD-10-CM

## 2023-10-30 DIAGNOSIS — Z3A.36 36 WEEKS GESTATION OF PREGNANCY: Primary | ICD-10-CM

## 2023-10-30 LAB
CULTURE: NORMAL
GLUCOSE URINE, POC: NORMAL
PROTEIN UA: NEGATIVE
SPECIMEN DESCRIPTION: NORMAL

## 2023-10-30 PROCEDURE — 76815 OB US LIMITED FETUS(S): CPT | Performed by: OBSTETRICS & GYNECOLOGY

## 2023-10-30 PROCEDURE — 99213 OFFICE O/P EST LOW 20 MIN: CPT | Performed by: OBSTETRICS & GYNECOLOGY

## 2023-10-30 PROCEDURE — 81002 URINALYSIS NONAUTO W/O SCOPE: CPT | Performed by: OBSTETRICS & GYNECOLOGY

## 2023-10-30 PROCEDURE — 76818 FETAL BIOPHYS PROFILE W/NST: CPT | Performed by: OBSTETRICS & GYNECOLOGY

## 2023-10-30 PROCEDURE — 99999 PR OFFICE/OUTPT VISIT,PROCEDURE ONLY: CPT | Performed by: OBSTETRICS & GYNECOLOGY

## 2023-10-30 PROCEDURE — 76820 UMBILICAL ARTERY ECHO: CPT | Performed by: OBSTETRICS & GYNECOLOGY

## 2023-10-31 ENCOUNTER — ROUTINE PRENATAL (OUTPATIENT)
Dept: OBGYN | Age: 33
End: 2023-10-31
Payer: COMMERCIAL

## 2023-10-31 VITALS
HEART RATE: 76 BPM | WEIGHT: 258.9 LBS | SYSTOLIC BLOOD PRESSURE: 111 MMHG | DIASTOLIC BLOOD PRESSURE: 75 MMHG | BODY MASS INDEX: 43.75 KG/M2

## 2023-10-31 DIAGNOSIS — O09.93 PREGNANCY, SUPERVISION, HIGH-RISK, THIRD TRIMESTER: Primary | ICD-10-CM

## 2023-10-31 LAB
GLUCOSE URINE, POC: NEGATIVE
PROTEIN UA: POSITIVE

## 2023-10-31 PROCEDURE — 0502F SUBSEQUENT PRENATAL CARE: CPT | Performed by: OBSTETRICS & GYNECOLOGY

## 2023-10-31 PROCEDURE — 81002 URINALYSIS NONAUTO W/O SCOPE: CPT | Performed by: OBSTETRICS & GYNECOLOGY

## 2023-10-31 PROCEDURE — 99213 OFFICE O/P EST LOW 20 MIN: CPT | Performed by: OBSTETRICS & GYNECOLOGY

## 2023-10-31 NOTE — PROGRESS NOTES
Good FM. No BLD LOF or CTN's. GBS is negative.  date set 11- noon reviewed signs and symptoms of PIH. Call if no fetal movement 8 hours.

## 2023-10-31 NOTE — PROGRESS NOTES
Patient alert and pleasant today with no complaints. Here today for prenatal visit. Urine for glucose trace and protein obtained with negative results. Discharge instructions given and patient directed to call the office with any questions or concerns and verbalized understanding.

## 2023-11-07 ENCOUNTER — ANCILLARY PROCEDURE (OUTPATIENT)
Dept: OBGYN CLINIC | Age: 33
End: 2023-11-07
Payer: COMMERCIAL

## 2023-11-07 ENCOUNTER — ROUTINE PRENATAL (OUTPATIENT)
Dept: OBGYN CLINIC | Age: 33
End: 2023-11-07
Payer: COMMERCIAL

## 2023-11-07 VITALS
SYSTOLIC BLOOD PRESSURE: 112 MMHG | WEIGHT: 259 LBS | DIASTOLIC BLOOD PRESSURE: 79 MMHG | HEART RATE: 108 BPM | BODY MASS INDEX: 43.77 KG/M2

## 2023-11-07 DIAGNOSIS — Z3A.37 37 WEEKS GESTATION OF PREGNANCY: Primary | ICD-10-CM

## 2023-11-07 DIAGNOSIS — O24.410 DIET CONTROLLED GESTATIONAL DIABETES MELLITUS (GDM) IN THIRD TRIMESTER: ICD-10-CM

## 2023-11-07 LAB
GLUCOSE URINE, POC: NORMAL
PROTEIN UA: NEGATIVE

## 2023-11-07 PROCEDURE — 81002 URINALYSIS NONAUTO W/O SCOPE: CPT | Performed by: OBSTETRICS & GYNECOLOGY

## 2023-11-07 PROCEDURE — 99213 OFFICE O/P EST LOW 20 MIN: CPT | Performed by: OBSTETRICS & GYNECOLOGY

## 2023-11-07 PROCEDURE — 76818 FETAL BIOPHYS PROFILE W/NST: CPT | Performed by: OBSTETRICS & GYNECOLOGY

## 2023-11-07 PROCEDURE — 99999 PR OFFICE/OUTPT VISIT,PROCEDURE ONLY: CPT | Performed by: OBSTETRICS & GYNECOLOGY

## 2023-11-07 NOTE — PROGRESS NOTES
[facility]   [address] FETAL MEDICINE  [address] [address]  Dept: [phone]  Loc: [phone]     2023    RE:  Faith Shaw     : 1990   AGE: 26 y[address]o. Dear Dr. Eulalio Gibson,    Thank you for allowing me to see Faith Shaw. As I'm sure you will recall, Faith Shaw is a 26 y.o. [id number]'s last menstrual period was 2023. Estimated Date of Delivery: 23 at 37w4d seen in our office today for the following:    REASON FOR VISIT: BPP and NST    Patient Active Problem List    Diagnosis Date Noted    37 weeks gestation of pregnancy 2023    Fourth pregnancy 2023    Gestational diabetes mellitus (GDM) in second trimester controlled on oral hypoglycemic drug 2023    Graves' disease 2022    Obesity 01/15/2022    History of  delivery, currently pregnant 2018        PAST HISTORY:  OB History    Para Term  AB Living   4 2 2   1 2   SAB IAB Ectopic Molar Multiple Live Births       1     2      # Outcome Date GA Lbr Ben/2nd Weight Sex Delivery Anes PTL Lv   4 Current            3 Term 19 37w0d  4.961 kg (10 lb 15 oz) M CS-LTranv Spinal  JUAN JOSE   2 Term 11 39w1d  4.621 kg (10 lb 3 oz) F CS-Unspec Spinal  JUAN JOSE   1 Ectopic 08    U              MEDICAL:  Past Medical History:   Diagnosis Date    History of pregnancy induced hypertension     hx ofGestational diabetes     Hypertension     Hyperthyroidism         SURGICAL:  Past Surgical History:   Procedure Laterality Date     SECTION      X2    DILATION AND CURETTAGE      ECTOPIC PREGNANCY SURGERY         ALLERGIES:    Allergies   Allergen Reactions    Bee Venom Swelling     Severe localized swelling. Severe localized swelling. Gluten     Hornet Venom Swelling     Severe localized swelling.          MEDICATIONS:    Current Outpatient Medications   Medication Sig

## 2023-11-07 NOTE — PROGRESS NOTES
Patient is here today for bpp/nst. Patient states she had a fall yesterday and landed on knee/ankle. Denies any vaginal bleeding, cramping, or leakage of fluids. Patient reports good fetal movement.

## 2023-11-09 ENCOUNTER — ROUTINE PRENATAL (OUTPATIENT)
Age: 33
End: 2023-11-09

## 2023-11-09 VITALS
HEART RATE: 94 BPM | WEIGHT: 261 LBS | SYSTOLIC BLOOD PRESSURE: 107 MMHG | DIASTOLIC BLOOD PRESSURE: 73 MMHG | BODY MASS INDEX: 44.11 KG/M2

## 2023-11-09 DIAGNOSIS — O09.93 ENCOUNTER FOR SUPERVISION OF HIGH RISK PREGNANCY IN THIRD TRIMESTER, ANTEPARTUM: Primary | ICD-10-CM

## 2023-11-14 ENCOUNTER — ANESTHESIA EVENT (OUTPATIENT)
Dept: LABOR AND DELIVERY | Age: 33
End: 2023-11-14
Payer: COMMERCIAL

## 2023-11-15 ENCOUNTER — ANESTHESIA (OUTPATIENT)
Dept: LABOR AND DELIVERY | Age: 33
End: 2023-11-15
Payer: COMMERCIAL

## 2023-11-15 ENCOUNTER — HOSPITAL ENCOUNTER (INPATIENT)
Age: 33
LOS: 3 days | Discharge: HOME | End: 2023-11-18
Attending: OBSTETRICS & GYNECOLOGY | Admitting: OBSTETRICS & GYNECOLOGY
Payer: COMMERCIAL

## 2023-11-15 PROBLEM — Z34.90 TERM PREGNANCY: Status: ACTIVE | Noted: 2023-11-15

## 2023-11-15 LAB
ABO + RH BLD: NORMAL
AMPHET UR QL SCN: NEGATIVE
ARM BAND NUMBER: NORMAL
BARBITURATES UR QL SCN: NEGATIVE
BENZODIAZ UR QL: NEGATIVE
BLOOD BANK SAMPLE EXPIRATION: NORMAL
BLOOD GROUP ANTIBODIES SERPL: NEGATIVE
CANNABINOIDS UR QL SCN: NEGATIVE
COCAINE UR QL SCN: NEGATIVE
ERYTHROCYTE [DISTWIDTH] IN BLOOD BY AUTOMATED COUNT: 13.5 % (ref 11.5–15)
FENTANYL UR QL: NEGATIVE
HCT VFR BLD AUTO: 37.4 % (ref 34–48)
HGB BLD-MCNC: 12.5 G/DL (ref 11.5–15.5)
MCH RBC QN AUTO: 31.3 PG (ref 26–35)
MCHC RBC AUTO-ENTMCNC: 33.4 G/DL (ref 32–34.5)
MCV RBC AUTO: 93.7 FL (ref 80–99.9)
METHADONE UR QL: NEGATIVE
OPIATES UR QL SCN: NEGATIVE
OXYCODONE UR QL SCN: NEGATIVE
PCP UR QL SCN: NEGATIVE
PLATELET # BLD AUTO: 242 K/UL (ref 130–450)
PMV BLD AUTO: 10.1 FL (ref 7–12)
RBC # BLD AUTO: 3.99 M/UL (ref 3.5–5.5)
TEST INFORMATION: NORMAL
WBC OTHER # BLD: 11 K/UL (ref 4.5–11.5)

## 2023-11-15 PROCEDURE — 86900 BLOOD TYPING SEROLOGIC ABO: CPT

## 2023-11-15 PROCEDURE — 6370000000 HC RX 637 (ALT 250 FOR IP): Performed by: OBSTETRICS & GYNECOLOGY

## 2023-11-15 PROCEDURE — 86901 BLOOD TYPING SEROLOGIC RH(D): CPT

## 2023-11-15 PROCEDURE — 80307 DRUG TEST PRSMV CHEM ANLYZR: CPT

## 2023-11-15 PROCEDURE — 85027 COMPLETE CBC AUTOMATED: CPT

## 2023-11-15 PROCEDURE — 7100000001 HC PACU RECOVERY - ADDTL 15 MIN: Performed by: OBSTETRICS & GYNECOLOGY

## 2023-11-15 PROCEDURE — 2580000003 HC RX 258: Performed by: OBSTETRICS & GYNECOLOGY

## 2023-11-15 PROCEDURE — 59510 CESAREAN DELIVERY: CPT | Performed by: OBSTETRICS & GYNECOLOGY

## 2023-11-15 PROCEDURE — 6360000002 HC RX W HCPCS: Performed by: OBSTETRICS & GYNECOLOGY

## 2023-11-15 PROCEDURE — 7100000000 HC PACU RECOVERY - FIRST 15 MIN: Performed by: OBSTETRICS & GYNECOLOGY

## 2023-11-15 PROCEDURE — 6360000002 HC RX W HCPCS

## 2023-11-15 PROCEDURE — 86850 RBC ANTIBODY SCREEN: CPT

## 2023-11-15 PROCEDURE — 2580000003 HC RX 258

## 2023-11-15 PROCEDURE — 3609079900 HC CESAREAN SECTION: Performed by: OBSTETRICS & GYNECOLOGY

## 2023-11-15 PROCEDURE — 1220000000 HC SEMI PRIVATE OB R&B

## 2023-11-15 PROCEDURE — 3700000001 HC ADD 15 MINUTES (ANESTHESIA): Performed by: OBSTETRICS & GYNECOLOGY

## 2023-11-15 PROCEDURE — 3700000000 HC ANESTHESIA ATTENDED CARE: Performed by: OBSTETRICS & GYNECOLOGY

## 2023-11-15 PROCEDURE — 2709999900 HC NON-CHARGEABLE SUPPLY: Performed by: OBSTETRICS & GYNECOLOGY

## 2023-11-15 RX ORDER — OXYCODONE HYDROCHLORIDE 5 MG/1
5 TABLET ORAL EVERY 4 HOURS PRN
Status: DISCONTINUED | OUTPATIENT
Start: 2023-11-15 | End: 2023-11-18 | Stop reason: HOSPADM

## 2023-11-15 RX ORDER — KETOROLAC TROMETHAMINE 30 MG/ML
30 INJECTION, SOLUTION INTRAMUSCULAR; INTRAVENOUS EVERY 6 HOURS
Status: DISPENSED | OUTPATIENT
Start: 2023-11-15 | End: 2023-11-16

## 2023-11-15 RX ORDER — ONDANSETRON 4 MG/1
4 TABLET, ORALLY DISINTEGRATING ORAL EVERY 8 HOURS PRN
Status: DISCONTINUED | OUTPATIENT
Start: 2023-11-15 | End: 2023-11-18 | Stop reason: HOSPADM

## 2023-11-15 RX ORDER — SODIUM CHLORIDE 0.9 % (FLUSH) 0.9 %
5-40 SYRINGE (ML) INJECTION PRN
Status: DISCONTINUED | OUTPATIENT
Start: 2023-11-15 | End: 2023-11-18 | Stop reason: HOSPADM

## 2023-11-15 RX ORDER — PHENYLEPHRINE HCL IN 0.9% NACL 1 MG/10 ML
SYRINGE (ML) INTRAVENOUS PRN
Status: DISCONTINUED | OUTPATIENT
Start: 2023-11-15 | End: 2023-11-15 | Stop reason: SDUPTHER

## 2023-11-15 RX ORDER — ACETAMINOPHEN 325 MG/1
975 TABLET ORAL ONCE
Status: COMPLETED | OUTPATIENT
Start: 2023-11-15 | End: 2023-11-15

## 2023-11-15 RX ORDER — ONDANSETRON 2 MG/ML
4 INJECTION INTRAMUSCULAR; INTRAVENOUS EVERY 6 HOURS PRN
Status: DISCONTINUED | OUTPATIENT
Start: 2023-11-15 | End: 2023-11-18 | Stop reason: HOSPADM

## 2023-11-15 RX ORDER — SODIUM CHLORIDE 0.9 % (FLUSH) 0.9 %
5-40 SYRINGE (ML) INJECTION EVERY 12 HOURS SCHEDULED
Status: DISCONTINUED | OUTPATIENT
Start: 2023-11-15 | End: 2023-11-18 | Stop reason: HOSPADM

## 2023-11-15 RX ORDER — MORPHINE SULFATE 4 MG/ML
4 INJECTION, SOLUTION INTRAMUSCULAR; INTRAVENOUS
Status: DISCONTINUED | OUTPATIENT
Start: 2023-11-15 | End: 2023-11-18 | Stop reason: HOSPADM

## 2023-11-15 RX ORDER — SODIUM CHLORIDE, SODIUM LACTATE, POTASSIUM CHLORIDE, CALCIUM CHLORIDE 600; 310; 30; 20 MG/100ML; MG/100ML; MG/100ML; MG/100ML
INJECTION, SOLUTION INTRAVENOUS CONTINUOUS
Status: DISCONTINUED | OUTPATIENT
Start: 2023-11-15 | End: 2023-11-18 | Stop reason: HOSPADM

## 2023-11-15 RX ORDER — CEFAZOLIN 3 G/1
INJECTION, POWDER, FOR SOLUTION INTRAVENOUS
Status: COMPLETED
Start: 2023-11-15 | End: 2023-11-15

## 2023-11-15 RX ORDER — SODIUM CHLORIDE 9 MG/ML
INJECTION, SOLUTION INTRAVENOUS PRN
Status: DISCONTINUED | OUTPATIENT
Start: 2023-11-15 | End: 2023-11-18 | Stop reason: HOSPADM

## 2023-11-15 RX ORDER — WATER 10 ML/10ML
INJECTION INTRAMUSCULAR; INTRAVENOUS; SUBCUTANEOUS
Status: DISPENSED
Start: 2023-11-15 | End: 2023-11-15

## 2023-11-15 RX ORDER — ONDANSETRON 2 MG/ML
INJECTION INTRAMUSCULAR; INTRAVENOUS PRN
Status: DISCONTINUED | OUTPATIENT
Start: 2023-11-15 | End: 2023-11-15 | Stop reason: SDUPTHER

## 2023-11-15 RX ORDER — SODIUM CHLORIDE, SODIUM LACTATE, POTASSIUM CHLORIDE, AND CALCIUM CHLORIDE .6; .31; .03; .02 G/100ML; G/100ML; G/100ML; G/100ML
1000 INJECTION, SOLUTION INTRAVENOUS ONCE
Status: DISCONTINUED | OUTPATIENT
Start: 2023-11-15 | End: 2023-11-18 | Stop reason: HOSPADM

## 2023-11-15 RX ORDER — MORPHINE SULFATE 2 MG/ML
2 INJECTION, SOLUTION INTRAMUSCULAR; INTRAVENOUS
Status: DISCONTINUED | OUTPATIENT
Start: 2023-11-15 | End: 2023-11-18 | Stop reason: HOSPADM

## 2023-11-15 RX ORDER — OXYCODONE HYDROCHLORIDE 5 MG/1
10 TABLET ORAL EVERY 4 HOURS PRN
Status: DISCONTINUED | OUTPATIENT
Start: 2023-11-15 | End: 2023-11-18 | Stop reason: HOSPADM

## 2023-11-15 RX ORDER — SODIUM CHLORIDE, SODIUM LACTATE, POTASSIUM CHLORIDE, CALCIUM CHLORIDE 600; 310; 30; 20 MG/100ML; MG/100ML; MG/100ML; MG/100ML
INJECTION, SOLUTION INTRAVENOUS CONTINUOUS PRN
Status: DISCONTINUED | OUTPATIENT
Start: 2023-11-15 | End: 2023-11-15 | Stop reason: SDUPTHER

## 2023-11-15 RX ORDER — BUPIVACAINE HYDROCHLORIDE 7.5 MG/ML
INJECTION, SOLUTION INTRASPINAL PRN
Status: DISCONTINUED | OUTPATIENT
Start: 2023-11-15 | End: 2023-11-15 | Stop reason: SDUPTHER

## 2023-11-15 RX ORDER — CITRIC ACID/SODIUM CITRATE 334-500MG
30 SOLUTION, ORAL ORAL ONCE
Status: COMPLETED | OUTPATIENT
Start: 2023-11-15 | End: 2023-11-15

## 2023-11-15 RX ORDER — BUPIVACAINE HYDROCHLORIDE 7.5 MG/ML
INJECTION, SOLUTION INTRASPINAL PRN
Status: DISCONTINUED | OUTPATIENT
Start: 2023-11-15 | End: 2023-11-15

## 2023-11-15 RX ORDER — SODIUM CHLORIDE 0.9 % (FLUSH) 0.9 %
10 SYRINGE (ML) INJECTION PRN
Status: DISCONTINUED | OUTPATIENT
Start: 2023-11-15 | End: 2023-11-18 | Stop reason: HOSPADM

## 2023-11-15 RX ORDER — FENTANYL CITRATE 50 UG/ML
50 INJECTION, SOLUTION INTRAMUSCULAR; INTRAVENOUS EVERY 5 MIN PRN
Status: DISCONTINUED | OUTPATIENT
Start: 2023-11-15 | End: 2023-11-18 | Stop reason: HOSPADM

## 2023-11-15 RX ORDER — DIPHENHYDRAMINE HCL 25 MG
25 TABLET ORAL EVERY 6 HOURS PRN
Status: ACTIVE | OUTPATIENT
Start: 2023-11-15 | End: 2023-11-16

## 2023-11-15 RX ORDER — OXYCODONE HYDROCHLORIDE 5 MG/1
5 TABLET ORAL EVERY 4 HOURS PRN
Status: ACTIVE | OUTPATIENT
Start: 2023-11-15 | End: 2023-11-16

## 2023-11-15 RX ORDER — ALBUTEROL SULFATE 2.5 MG/3ML
2.5 SOLUTION RESPIRATORY (INHALATION) EVERY 4 HOURS PRN
Status: DISCONTINUED | OUTPATIENT
Start: 2023-11-15 | End: 2023-11-18 | Stop reason: HOSPADM

## 2023-11-15 RX ORDER — DOCUSATE SODIUM 100 MG/1
100 CAPSULE, LIQUID FILLED ORAL 2 TIMES DAILY
Status: DISCONTINUED | OUTPATIENT
Start: 2023-11-15 | End: 2023-11-18 | Stop reason: HOSPADM

## 2023-11-15 RX ORDER — ONDANSETRON 2 MG/ML
4 INJECTION INTRAMUSCULAR; INTRAVENOUS EVERY 6 HOURS PRN
Status: ACTIVE | OUTPATIENT
Start: 2023-11-15 | End: 2023-11-16

## 2023-11-15 RX ORDER — IBUPROFEN 800 MG/1
800 TABLET ORAL EVERY 6 HOURS
Status: DISCONTINUED | OUTPATIENT
Start: 2023-11-16 | End: 2023-11-18 | Stop reason: HOSPADM

## 2023-11-15 RX ORDER — ACETAMINOPHEN 500 MG
1000 TABLET ORAL EVERY 6 HOURS SCHEDULED
Status: DISCONTINUED | OUTPATIENT
Start: 2023-11-16 | End: 2023-11-18 | Stop reason: HOSPADM

## 2023-11-15 RX ORDER — OXYCODONE HYDROCHLORIDE 5 MG/1
10 TABLET ORAL EVERY 4 HOURS PRN
Status: ACTIVE | OUTPATIENT
Start: 2023-11-15 | End: 2023-11-16

## 2023-11-15 RX ORDER — MODIFIED LANOLIN
OINTMENT (GRAM) TOPICAL
Status: DISCONTINUED | OUTPATIENT
Start: 2023-11-15 | End: 2023-11-18 | Stop reason: HOSPADM

## 2023-11-15 RX ORDER — KETOROLAC TROMETHAMINE 30 MG/ML
15 INJECTION, SOLUTION INTRAMUSCULAR; INTRAVENOUS EVERY 6 HOURS PRN
Status: DISPENSED | OUTPATIENT
Start: 2023-11-15 | End: 2023-11-16

## 2023-11-15 RX ORDER — DIPHENHYDRAMINE HYDROCHLORIDE 50 MG/ML
25 INJECTION INTRAMUSCULAR; INTRAVENOUS EVERY 6 HOURS PRN
Status: ACTIVE | OUTPATIENT
Start: 2023-11-15 | End: 2023-11-16

## 2023-11-15 RX ORDER — FENTANYL CITRATE 50 UG/ML
25 INJECTION, SOLUTION INTRAMUSCULAR; INTRAVENOUS EVERY 5 MIN PRN
Status: DISCONTINUED | OUTPATIENT
Start: 2023-11-15 | End: 2023-11-18 | Stop reason: HOSPADM

## 2023-11-15 RX ORDER — MORPHINE SULFATE 1 MG/ML
INJECTION, SOLUTION EPIDURAL; INTRATHECAL; INTRAVENOUS PRN
Status: DISCONTINUED | OUTPATIENT
Start: 2023-11-15 | End: 2023-11-15 | Stop reason: SDUPTHER

## 2023-11-15 RX ORDER — PROCHLORPERAZINE EDISYLATE 5 MG/ML
5 INJECTION INTRAMUSCULAR; INTRAVENOUS
Status: ACTIVE | OUTPATIENT
Start: 2023-11-15 | End: 2023-11-16

## 2023-11-15 RX ADMIN — SODIUM CHLORIDE, POTASSIUM CHLORIDE, SODIUM LACTATE AND CALCIUM CHLORIDE: 600; 310; 30; 20 INJECTION, SOLUTION INTRAVENOUS at 12:24

## 2023-11-15 RX ADMIN — MORPHINE SULFATE 0.15 MG: 1 INJECTION, SOLUTION EPIDURAL; INTRATHECAL; INTRAVENOUS at 12:14

## 2023-11-15 RX ADMIN — ONDANSETRON 4 MG: 2 INJECTION INTRAMUSCULAR; INTRAVENOUS at 12:29

## 2023-11-15 RX ADMIN — SODIUM CHLORIDE, POTASSIUM CHLORIDE, SODIUM LACTATE AND CALCIUM CHLORIDE: 600; 310; 30; 20 INJECTION, SOLUTION INTRAVENOUS at 12:07

## 2023-11-15 RX ADMIN — SODIUM CHLORIDE, POTASSIUM CHLORIDE, SODIUM LACTATE AND CALCIUM CHLORIDE: 600; 310; 30; 20 INJECTION, SOLUTION INTRAVENOUS at 11:42

## 2023-11-15 RX ADMIN — KETOROLAC TROMETHAMINE 30 MG: 30 INJECTION, SOLUTION INTRAMUSCULAR; INTRAVENOUS at 15:09

## 2023-11-15 RX ADMIN — SODIUM CHLORIDE, POTASSIUM CHLORIDE, SODIUM LACTATE AND CALCIUM CHLORIDE: 600; 310; 30; 20 INJECTION, SOLUTION INTRAVENOUS at 10:21

## 2023-11-15 RX ADMIN — KETOROLAC TROMETHAMINE 30 MG: 30 INJECTION, SOLUTION INTRAMUSCULAR; INTRAVENOUS at 21:43

## 2023-11-15 RX ADMIN — Medication 100 MCG: at 12:21

## 2023-11-15 RX ADMIN — Medication 100 MCG: at 12:16

## 2023-11-15 RX ADMIN — Medication 100 MCG: at 12:27

## 2023-11-15 RX ADMIN — CEFAZOLIN 3000 MG: 3 INJECTION, POWDER, FOR SOLUTION INTRAVENOUS at 12:03

## 2023-11-15 RX ADMIN — Medication 909 ML/HR: at 12:30

## 2023-11-15 RX ADMIN — ACETAMINOPHEN 975 MG: 325 TABLET ORAL at 12:03

## 2023-11-15 RX ADMIN — SODIUM CITRATE AND CITRIC ACID MONOHYDRATE 30 ML: 500; 334 SOLUTION ORAL at 12:04

## 2023-11-15 RX ADMIN — BUPIVACAINE HYDROCHLORIDE 1.6 ML: 7.5 INJECTION, SOLUTION SUBARACHNOID at 12:14

## 2023-11-15 ASSESSMENT — PAIN DESCRIPTION - ORIENTATION: ORIENTATION: LOWER

## 2023-11-15 ASSESSMENT — PAIN DESCRIPTION - DESCRIPTORS
DESCRIPTORS: ACHING
DESCRIPTORS: CRAMPING;DISCOMFORT

## 2023-11-15 ASSESSMENT — PAIN - FUNCTIONAL ASSESSMENT: PAIN_FUNCTIONAL_ASSESSMENT: ACTIVITIES ARE NOT PREVENTED

## 2023-11-15 ASSESSMENT — PAIN SCALES - GENERAL
PAINLEVEL_OUTOF10: 0
PAINLEVEL_OUTOF10: 3
PAINLEVEL_OUTOF10: 5

## 2023-11-15 ASSESSMENT — PAIN DESCRIPTION - LOCATION
LOCATION: ABDOMEN
LOCATION: ABDOMEN

## 2023-11-15 NOTE — PROGRESS NOTES
Mother and baby brought over via stretcher in stable condition. Baby placed in basinette and Mother was able to transfer herself into bed with minimal assist.  IV LR infusing per order. Pt alert and oriented and denies pain except upon movement. Bedside folder with papers reviewed with pt-verbalized understanding. Refused TDAP and Flu and refusing Hep B for baby. Oriented to room and unit and unit policies including infant safety.

## 2023-11-15 NOTE — ANESTHESIA PROCEDURE NOTES
Spinal Block    Patient location during procedure: OR  End time: 11/15/2023 12:14 PM  Reason for block: primary anesthetic  Staffing  Performed: other anesthesia staff   Anesthesiologist: Abhishek Rao MD  Resident/CRNA: DEMETRA Collier CRNA  Other anesthesia staff: Nicolás Aldridge RN  Performed by: Nicolás Aldridge RN  Authorized by: Abhishek Rao MD    Spinal Block  Patient position: sitting  Prep: ChloraPrep  Patient monitoring: continuous pulse ox and frequent blood pressure checks  Approach: midline  Location: L3/L4  Provider prep: mask and sterile gloves  Needle  Needle type: Pencan   Needle gauge: 25 G  Needle length: 3.5 in  Assessment  Sensory level: T4  Swirl obtained: Yes  CSF: clear  Attempts: 1  Hemodynamics: stable  Preanesthetic Checklist  Completed: patient identified, IV checked, site marked, risks and benefits discussed, surgical/procedural consents, equipment checked, pre-op evaluation, timeout performed, anesthesia consent given, oxygen available, monitors applied/VS acknowledged, fire risk safety assessment completed and verbalized and blood product R/B/A discussed and consented

## 2023-11-15 NOTE — OP NOTE
Operative Note      Patient: Adrain Romberg  YOB: 1990  MRN: [id number]    Date of Procedure: 11/15/2023    Pre-Op Diagnosis Codes:     * Previous  section [Z98.891]         Obesity          Previous CS x 2          GDM      Post-Op Diagnosis: Same       Procedure(s):   SECTION repeat low transverse    Surgeon(s):  Sandra Hutson DO    Assistant: Aster Cristina CNP      Anesthesia: Spinal    Estimated Blood Loss (mL): 604    Complications: None    Specimens:   * No specimens in log *    Implants:  * No implants in log *      Drains: * No LDAs found *    Findings: 1229 delivered Apgars 9 and 9 weighing 8 pounds 11 ounces    In the lieu of a qualified resident I needed Talya's assistance with retraction incision delivery and repair. Detailed Description of Procedure:   Scot Kehr was identified in the recovery room her history is updated. She is intact the operating room and again correctly identified. Spinal anesthesia was administered. Portillo catheter was inserted. She was then prepped and draped in usual fashion. A pause to place order for the patient procedure that all incidents were present. Skin incision was made through her previous incision 3 fingerbreadths above the pubic bone. This was carried down to the fascia which was extended bluntly in both directions and then superiorly and inferiorly both directions. Rectus muscle was applied in the midline and entered bluntly and extended bluntly. The William retractor was positioned. The uterus was scored in a semilunar fashion and extended superiorly and inferiorly bluntly in both directions. Hand was placed in uterine cavity the fetal head was floating fundal pressure help to achieve delivery. Mouth and nares were suctioned. After 30 seconds the cord was clamped and cut. Placenta delivered spontaneously.   The uterus then reapproximated with 0 Vicryl there was 1 area of bleeding on the right corner which 2

## 2023-11-15 NOTE — H&P
CHIEF COMPLAINT: For repeat     HISTORY OF PRESENT ILLNESS:  Janet Alejo is a 26 y.o. female [id number], Patient's last menstrual period was 2023.,  at 38w5d. Presents to L&D with  1. Repeat   2. Obesity  3 GDM    Prenatals reviewed or not available. OB History          4    Para   2    Term   2            AB   1    Living   2         SAB        IAB        Ectopic   1    Molar        Multiple        Live Births   2                Estimated Due Date: determined by:       Pregnancy complicated by:   Patient Active Problem List   Diagnosis Code    Graves' disease E05.00    History of  delivery, currently pregnant O28.200    Obesity E66.9    37 weeks gestation of pregnancy Z3A.37    Fourth pregnancy Z34.90    Gestational diabetes mellitus (GDM) in second trimester controlled on oral hypoglycemic drug O24.415    Term pregnancy Z34.90     PAST OB HISTORY  OB History          4    Para   2    Term   2            AB   1    Living   2         SAB        IAB        Ectopic   1    Molar        Multiple        Live Births   2                Past Medical History:        Diagnosis Date    Herpes simplex virus (HSV) infection     History of pregnancy induced hypertension     hx ofGestational diabetes     Hypertension     Hyperthyroidism      Specifically no history of asthma. Past Surgical History:        Procedure Laterality Date     SECTION      X2    DILATION AND CURETTAGE      ECTOPIC PREGNANCY SURGERY       Specifically no history of anesthesia reactions or problems. No history of bleeding or trouble healing / recovering from surgery. Social History:    TOBACCO:   reports that she has quit smoking. Her smoking use included cigarettes. She has quit using smokeless tobacco.  ETOH:   reports that she does not currently use alcohol. DRUGS:   reports no history of drug use.     Family History:       Problem Relation Age of Onset

## 2023-11-15 NOTE — PROGRESS NOTES
Hannah done pt moving fair. Gave torodol 30 ivp for pain. Applied new pads. Portillo emptied for 800. Pt moved to mom baby via cart.  Room 322 report to staff

## 2023-11-16 LAB
ERYTHROCYTE [DISTWIDTH] IN BLOOD BY AUTOMATED COUNT: 13.6 % (ref 11.5–15)
HCT VFR BLD AUTO: 30.7 % (ref 34–48)
HGB BLD-MCNC: 10.5 G/DL (ref 11.5–15.5)
MCH RBC QN AUTO: 32.1 PG (ref 26–35)
MCHC RBC AUTO-ENTMCNC: 34.2 G/DL (ref 32–34.5)
MCV RBC AUTO: 93.9 FL (ref 80–99.9)
PLATELET # BLD AUTO: 179 K/UL (ref 130–450)
PMV BLD AUTO: 9.2 FL (ref 7–12)
RBC # BLD AUTO: 3.27 M/UL (ref 3.5–5.5)
WBC OTHER # BLD: 10.9 K/UL (ref 4.5–11.5)

## 2023-11-16 PROCEDURE — 2580000003 HC RX 258: Performed by: OBSTETRICS & GYNECOLOGY

## 2023-11-16 PROCEDURE — 2580000003 HC RX 258: Performed by: ANESTHESIOLOGY

## 2023-11-16 PROCEDURE — 6360000002 HC RX W HCPCS: Performed by: OBSTETRICS & GYNECOLOGY

## 2023-11-16 PROCEDURE — 6370000000 HC RX 637 (ALT 250 FOR IP): Performed by: OBSTETRICS & GYNECOLOGY

## 2023-11-16 PROCEDURE — APPNB30 APP NON BILLABLE TIME 0-30 MINS: Performed by: PHYSICIAN ASSISTANT

## 2023-11-16 PROCEDURE — 1220000000 HC SEMI PRIVATE OB R&B

## 2023-11-16 PROCEDURE — 36415 COLL VENOUS BLD VENIPUNCTURE: CPT

## 2023-11-16 PROCEDURE — 6360000002 HC RX W HCPCS: Performed by: ANESTHESIOLOGY

## 2023-11-16 PROCEDURE — 6370000000 HC RX 637 (ALT 250 FOR IP): Performed by: ANESTHESIOLOGY

## 2023-11-16 PROCEDURE — 85027 COMPLETE CBC AUTOMATED: CPT

## 2023-11-16 RX ADMIN — DOCUSATE SODIUM 100 MG: 100 CAPSULE, LIQUID FILLED ORAL at 08:26

## 2023-11-16 RX ADMIN — ACETAMINOPHEN 1000 MG: 500 TABLET ORAL at 13:15

## 2023-11-16 RX ADMIN — KETOROLAC TROMETHAMINE 15 MG: 30 INJECTION, SOLUTION INTRAMUSCULAR; INTRAVENOUS at 11:32

## 2023-11-16 RX ADMIN — OXYCODONE 5 MG: 5 TABLET ORAL at 10:23

## 2023-11-16 RX ADMIN — ACETAMINOPHEN 1000 MG: 500 TABLET ORAL at 20:43

## 2023-11-16 RX ADMIN — ACETAMINOPHEN 1000 MG: 500 TABLET ORAL at 03:28

## 2023-11-16 RX ADMIN — SODIUM CHLORIDE, PRESERVATIVE FREE 10 ML: 5 INJECTION INTRAVENOUS at 05:35

## 2023-11-16 RX ADMIN — IBUPROFEN 800 MG: 800 TABLET, FILM COATED ORAL at 17:28

## 2023-11-16 RX ADMIN — KETOROLAC TROMETHAMINE 30 MG: 30 INJECTION, SOLUTION INTRAMUSCULAR; INTRAVENOUS at 05:35

## 2023-11-16 RX ADMIN — Medication 10 ML: at 03:01

## 2023-11-16 RX ADMIN — OXYCODONE 5 MG: 5 TABLET ORAL at 20:42

## 2023-11-16 RX ADMIN — Medication 10 ML: at 11:33

## 2023-11-16 RX ADMIN — DOCUSATE SODIUM 100 MG: 100 CAPSULE, LIQUID FILLED ORAL at 20:42

## 2023-11-16 ASSESSMENT — PAIN DESCRIPTION - LOCATION
LOCATION: ABDOMEN
LOCATION: INCISION
LOCATION: ABDOMEN

## 2023-11-16 ASSESSMENT — PAIN SCALES - GENERAL
PAINLEVEL_OUTOF10: 5
PAINLEVEL_OUTOF10: 8
PAINLEVEL_OUTOF10: 5
PAINLEVEL_OUTOF10: 2
PAINLEVEL_OUTOF10: 1
PAINLEVEL_OUTOF10: 6
PAINLEVEL_OUTOF10: 4
PAINLEVEL_OUTOF10: 5
PAINLEVEL_OUTOF10: 5
PAINLEVEL_OUTOF10: 4
PAINLEVEL_OUTOF10: 5
PAINLEVEL_OUTOF10: 1
PAINLEVEL_OUTOF10: 2

## 2023-11-16 ASSESSMENT — PAIN DESCRIPTION - DESCRIPTORS
DESCRIPTORS: CRAMPING;DISCOMFORT
DESCRIPTORS: CRAMPING;DISCOMFORT
DESCRIPTORS: DISCOMFORT;SORE
DESCRIPTORS: CRAMPING;DISCOMFORT
DESCRIPTORS: DISCOMFORT;SORE
DESCRIPTORS: DISCOMFORT;SORE;TENDER

## 2023-11-16 ASSESSMENT — PAIN - FUNCTIONAL ASSESSMENT
PAIN_FUNCTIONAL_ASSESSMENT: ACTIVITIES ARE NOT PREVENTED

## 2023-11-16 ASSESSMENT — PAIN DESCRIPTION - ORIENTATION
ORIENTATION: LOWER

## 2023-11-16 NOTE — LACTATION NOTE
Multiparous, experienced breastfeeding mom breast fed as long as 2 1/2 years. Mom supplemented this baby with infant formula because her last baby had to go the NICU for low blood glucose levels. Mom is a GDM. Mom desires to exclusively breastfeed baby. We discussed setting up the EBP to help mom supplement baby with her own colostrum. Set up the [employer] EBP and explained use and care. Encouraged her to breastfeed first then pump. Assisted mom with staying latched on the left breast in football hold. Taught mom how to keep baby interested in feeding longer. Baby did well. Discussed frequency and duration of breastfeeds and signs of adequate milk transfer. Encouraged mom to hand express drops of colostrum at the start of a  breastfeed. Recommended to avoid a pacifier for three weeks or until breastfeeding is well established. Mom is requesting an electric breast pump for home. Went over breastfeeding resources. Encouraged mom to call us with questions or concerns or for assistance.

## 2023-11-16 NOTE — PLAN OF CARE
Problem: Pain  Goal: Verbalizes/displays adequate comfort level or baseline comfort level  Outcome: Progressing     Problem: Vaginal Birth or  Section  Goal: Fetal and maternal status remain reassuring during the birth process  Description:  Birth OB-Pregnancy care plan goal which identifies if the fetal and maternal status remain reassuring during the birth process  Outcome: Progressing     Problem: Postpartum  Goal: Experiences normal postpartum course  Description:  Postpartum OB-Pregnancy care plan goal which identifies if the mother is experiencing a normal postpartum course  Outcome: Progressing  Goal: Appropriate maternal -  bonding  Description:  Postpartum OB-Pregnancy care plan goal which identifies if the mother and  are bonding appropriately  Outcome: Progressing  Goal: Establishment of infant feeding pattern  Description:  Postpartum OB-Pregnancy care plan goal which identifies if the mother is establishing a feeding pattern with their   Outcome: Progressing  Goal: Incisions, wounds, or drain sites healing without S/S of infection  Outcome: Progressing     Problem: Infection - Adult  Goal: Absence of infection at discharge  Outcome: Progressing  Goal: Absence of infection during hospitalization  Outcome: Progressing  Goal: Absence of fever/infection during anticipated neutropenic period  Outcome: Progressing     Problem: Infection - Adult  Goal: Absence of infection at discharge  Outcome: Progressing  Goal: Absence of infection during hospitalization  Outcome: Progressing  Goal: Absence of fever/infection during anticipated neutropenic period  Outcome: Progressing     Problem: Safety - Adult  Goal: Free from fall injury  Outcome: Progressing     Problem: Discharge Planning  Goal: Discharge to home or other facility with appropriate resources  Outcome: Progressing     Problem: Chronic Conditions and Co-morbidities  Goal: Patient's chronic conditions and co-morbidity

## 2023-11-16 NOTE — ANESTHESIA POSTPROCEDURE EVALUATION
Department of Anesthesiology  Postprocedure Note    Patient: Adrain Romberg  MRN: [id number]  YOB: 1990  Date of evaluation: 2023      Procedure Summary     Date: 11/15/23 Room / Location: [facility] OR 01 / [facility]    Anesthesia Start: 5513 Anesthesia Stop: 4658    Procedure:  SECTION (Uterus) Diagnosis:       Previous  section      (Previous  section [Z62.984])    Surgeons: Sandra Hutson DO Responsible Provider: Rhianna Daily MD    Anesthesia Type: spinal ASA Status: 3          Anesthesia Type: No value filed.     Fabiana Phase I: Fabiana Score: 10    Fabiaan Phase II: Fabiana Score: 10      Anesthesia Post Evaluation    Patient location during evaluation: bedside  Patient participation: complete - patient participated  Level of consciousness: awake and alert  Pain score: 5  Airway patency: patent  Nausea & Vomiting: no nausea and no vomiting  Complications: no  Cardiovascular status: hemodynamically stable  Respiratory status: acceptable and spontaneous ventilation  Hydration status: euvolemic  Pain management: satisfactory to patient

## 2023-11-16 NOTE — FLOWSHEET NOTE
Patient chamberlain output 550mL. Patient dangled and stood at bedside, denied dizziness. Patient ambulated to the bathroom independently, tolerated well. Chamberlain catheter removed, patient could not void at this time. DTV [id number]. Bleeding small, no clots. Joanna care demonstration given. Pads, underwear, and gown changed. Patient ambulated back to bed. Tolerated well.

## 2023-11-17 PROCEDURE — 1220000000 HC SEMI PRIVATE OB R&B

## 2023-11-17 PROCEDURE — 6370000000 HC RX 637 (ALT 250 FOR IP): Performed by: PHYSICIAN ASSISTANT

## 2023-11-17 PROCEDURE — 6370000000 HC RX 637 (ALT 250 FOR IP): Performed by: OBSTETRICS & GYNECOLOGY

## 2023-11-17 PROCEDURE — APPNB30 APP NON BILLABLE TIME 0-30 MINS: Performed by: PHYSICIAN ASSISTANT

## 2023-11-17 PROCEDURE — 59514 CESAREAN DELIVERY ONLY: CPT | Performed by: NURSE PRACTITIONER

## 2023-11-17 RX ORDER — BISACODYL 10 MG
10 SUPPOSITORY, RECTAL RECTAL DAILY PRN
Status: DISCONTINUED | OUTPATIENT
Start: 2023-11-17 | End: 2023-11-18 | Stop reason: HOSPADM

## 2023-11-17 RX ORDER — SIMETHICONE 80 MG
80 TABLET,CHEWABLE ORAL
Status: DISCONTINUED | OUTPATIENT
Start: 2023-11-17 | End: 2023-11-18 | Stop reason: HOSPADM

## 2023-11-17 RX ADMIN — ACETAMINOPHEN 1000 MG: 500 TABLET ORAL at 05:24

## 2023-11-17 RX ADMIN — SIMETHICONE 80 MG: 80 TABLET, CHEWABLE ORAL at 18:17

## 2023-11-17 RX ADMIN — IBUPROFEN 800 MG: 800 TABLET, FILM COATED ORAL at 01:58

## 2023-11-17 RX ADMIN — OXYCODONE 10 MG: 5 TABLET ORAL at 05:23

## 2023-11-17 RX ADMIN — OXYCODONE 10 MG: 5 TABLET ORAL at 15:59

## 2023-11-17 RX ADMIN — IBUPROFEN 800 MG: 800 TABLET, FILM COATED ORAL at 18:16

## 2023-11-17 RX ADMIN — SIMETHICONE 80 MG: 80 TABLET, CHEWABLE ORAL at 12:02

## 2023-11-17 RX ADMIN — ACETAMINOPHEN 1000 MG: 500 TABLET ORAL at 12:03

## 2023-11-17 RX ADMIN — DOCUSATE SODIUM 100 MG: 100 CAPSULE, LIQUID FILLED ORAL at 20:10

## 2023-11-17 RX ADMIN — OXYCODONE 10 MG: 5 TABLET ORAL at 20:10

## 2023-11-17 RX ADMIN — OXYCODONE 10 MG: 5 TABLET ORAL at 12:02

## 2023-11-17 RX ADMIN — IBUPROFEN 800 MG: 800 TABLET, FILM COATED ORAL at 08:52

## 2023-11-17 RX ADMIN — OXYCODONE 10 MG: 5 TABLET ORAL at 00:48

## 2023-11-17 RX ADMIN — ACETAMINOPHEN 1000 MG: 500 TABLET ORAL at 20:11

## 2023-11-17 RX ADMIN — DOCUSATE SODIUM 100 MG: 100 CAPSULE, LIQUID FILLED ORAL at 08:52

## 2023-11-17 ASSESSMENT — PAIN DESCRIPTION - ORIENTATION
ORIENTATION: LOWER
ORIENTATION: LOWER
ORIENTATION: LOWER;INNER
ORIENTATION: LOWER
ORIENTATION: LOWER

## 2023-11-17 ASSESSMENT — PAIN DESCRIPTION - DESCRIPTORS
DESCRIPTORS: ACHING;BURNING;SORE
DESCRIPTORS: DISCOMFORT;CRAMPING;SORE;TENDER
DESCRIPTORS: DISCOMFORT;CRAMPING;TENDER
DESCRIPTORS: DISCOMFORT;SORE;TENDER
DESCRIPTORS: ACHING;BURNING;SORE
DESCRIPTORS: CRAMPING;DISCOMFORT;SORE;TENDER

## 2023-11-17 ASSESSMENT — PAIN - FUNCTIONAL ASSESSMENT
PAIN_FUNCTIONAL_ASSESSMENT: ACTIVITIES ARE NOT PREVENTED

## 2023-11-17 ASSESSMENT — PAIN DESCRIPTION - LOCATION
LOCATION: ABDOMEN;INCISION
LOCATION: INCISION
LOCATION: ABDOMEN;INCISION
LOCATION: INCISION;ABDOMEN
LOCATION: ABDOMEN;INCISION
LOCATION: INCISION

## 2023-11-17 ASSESSMENT — PAIN SCALES - GENERAL
PAINLEVEL_OUTOF10: 8
PAINLEVEL_OUTOF10: 6
PAINLEVEL_OUTOF10: 9
PAINLEVEL_OUTOF10: 7
PAINLEVEL_OUTOF10: 8
PAINLEVEL_OUTOF10: 2
PAINLEVEL_OUTOF10: 8

## 2023-11-17 NOTE — PLAN OF CARE
Problem: Pain  Goal: Verbalizes/displays adequate comfort level or baseline comfort level  Outcome: Progressing     Problem: Vaginal Birth or  Section  Goal: Fetal and maternal status remain reassuring during the birth process  Description:  Birth OB-Pregnancy care plan goal which identifies if the fetal and maternal status remain reassuring during the birth process  Outcome: Progressing     Problem: Postpartum  Goal: Experiences normal postpartum course  Description:  Postpartum OB-Pregnancy care plan goal which identifies if the mother is experiencing a normal postpartum course  Outcome: Progressing     Problem: Postpartum  Goal: Appropriate maternal -  bonding  Description:  Postpartum OB-Pregnancy care plan goal which identifies if the mother and  are bonding appropriately  Outcome: Progressing     Problem: Postpartum  Goal: Establishment of infant feeding pattern  Description:  Postpartum OB-Pregnancy care plan goal which identifies if the mother is establishing a feeding pattern with their   Outcome: Progressing     Problem: Postpartum  Goal: Incisions, wounds, or drain sites healing without S/S of infection  Outcome: Progressing     Problem: Infection - Adult  Goal: Absence of infection at discharge  Outcome: Progressing     Problem: Infection - Adult  Goal: Absence of infection during hospitalization  Outcome: Progressing     Problem: Infection - Adult  Goal: Absence of fever/infection during anticipated neutropenic period  Outcome: Progressing     Problem: Discharge Planning  Goal: Discharge to home or other facility with appropriate resources  Outcome: Progressing

## 2023-11-17 NOTE — PROGRESS NOTES
Pt resting in bed bonding w/ baby; states taking general diet w/o nausea and passing gas; states pain meds effective; states feeling better than ystrdy but does not feel ready to go home today.

## 2023-11-17 NOTE — PROGRESS NOTES
PreOp DX:  38.5 weeks Pregnancy    Previous C/Section      Post OP Dx:  same                       Living female baby delivered      Procedure:  Surgery/ Assistance    Anesthesia: spinal      Under spinal anesthesia the abdomen was prepared and draped . With my technical assistance Dr Eulalio Gibson performed  a , I assisted- in the absence of a surgical resident -with retraction, exposure, delivery of infant, and suture cutting/management throughout the entire procedure. Clear amniotic fluid. A living male/female infant was delivered at 56, with Apgar scores of 9 at 1 minute & 9 at 5 minutes, respectively. (-) nuchal cord. (-) meconium. 3 vessel umbilical cord. Then the uterus and the abdomen were closed. Procedure was well tolerated.     Mayank Lomeli, APRN - CNP

## 2023-11-17 NOTE — LACTATION NOTE
Pt reports sore nipples with bi-lateral bruising. Assisted pt with positioning using football hold. Encouraged to wait for baby's wide gape before attempting to breastfeed. Baby latched well on second attempt and pt noticed immediate improvement in comfort. Reviewed steps to proper latch. Hydrogel pads given to help with tender nipples.

## 2023-11-18 VITALS
WEIGHT: 261 LBS | HEIGHT: 64 IN | SYSTOLIC BLOOD PRESSURE: 138 MMHG | DIASTOLIC BLOOD PRESSURE: 63 MMHG | BODY MASS INDEX: 44.56 KG/M2 | TEMPERATURE: 98.7 F | OXYGEN SATURATION: 96 % | RESPIRATION RATE: 16 BRPM | HEART RATE: 80 BPM

## 2023-11-18 PROBLEM — Z34.90 TERM PREGNANCY: Status: RESOLVED | Noted: 2023-11-15 | Resolved: 2023-11-18

## 2023-11-18 PROBLEM — O34.219 HISTORY OF CESAREAN DELIVERY, CURRENTLY PREGNANT: Status: RESOLVED | Noted: 2018-11-20 | Resolved: 2023-11-18

## 2023-11-18 PROBLEM — Z3A.37 37 WEEKS GESTATION OF PREGNANCY: Status: RESOLVED | Noted: 2023-07-05 | Resolved: 2023-11-18

## 2023-11-18 PROBLEM — Z34.90 FOURTH PREGNANCY: Status: RESOLVED | Noted: 2023-07-05 | Resolved: 2023-11-18

## 2023-11-18 PROCEDURE — 6370000000 HC RX 637 (ALT 250 FOR IP): Performed by: OBSTETRICS & GYNECOLOGY

## 2023-11-18 PROCEDURE — 6370000000 HC RX 637 (ALT 250 FOR IP): Performed by: PHYSICIAN ASSISTANT

## 2023-11-18 PROCEDURE — 6370000000 HC RX 637 (ALT 250 FOR IP): Performed by: ADVANCED PRACTICE MIDWIFE

## 2023-11-18 PROCEDURE — APPNB30 APP NON BILLABLE TIME 0-30 MINS: Performed by: ADVANCED PRACTICE MIDWIFE

## 2023-11-18 RX ORDER — OXYCODONE HYDROCHLORIDE 5 MG/1
5 TABLET ORAL EVERY 6 HOURS PRN
Qty: 18 TABLET | Refills: 0 | Status: SHIPPED | OUTPATIENT
Start: 2023-11-18 | End: 2023-11-23

## 2023-11-18 RX ORDER — IBUPROFEN 800 MG/1
800 TABLET ORAL EVERY 6 HOURS
Qty: 120 TABLET | Refills: 3 | Status: SHIPPED | OUTPATIENT
Start: 2023-11-18

## 2023-11-18 RX ORDER — NYSTATIN 100000 U/G
CREAM TOPICAL
Qty: 1 EACH | Refills: 1 | Status: SHIPPED | OUTPATIENT
Start: 2023-11-18

## 2023-11-18 RX ORDER — OXYCODONE HYDROCHLORIDE 10 MG/1
10 TABLET ORAL EVERY 4 HOURS PRN
Qty: 18 TABLET | Refills: 0 | Status: SHIPPED | OUTPATIENT
Start: 2023-11-18 | End: 2023-11-18 | Stop reason: HOSPADM

## 2023-11-18 RX ORDER — PSEUDOEPHEDRINE HCL 30 MG
100 TABLET ORAL 2 TIMES DAILY
Qty: 60 CAPSULE | Refills: 1 | Status: SHIPPED | OUTPATIENT
Start: 2023-11-18

## 2023-11-18 RX ORDER — LANOLIN ALCOHOL/MO/W.PET/CERES
325 CREAM (GRAM) TOPICAL
Qty: 60 TABLET | Refills: 1 | Status: SHIPPED | OUTPATIENT
Start: 2023-11-18

## 2023-11-18 RX ADMIN — OXYCODONE 10 MG: 5 TABLET ORAL at 00:47

## 2023-11-18 RX ADMIN — MICONAZOLE NITRATE: 2 OINTMENT TOPICAL at 12:39

## 2023-11-18 RX ADMIN — IBUPROFEN 800 MG: 800 TABLET, FILM COATED ORAL at 03:07

## 2023-11-18 RX ADMIN — OXYCODONE 10 MG: 5 TABLET ORAL at 05:14

## 2023-11-18 RX ADMIN — ACETAMINOPHEN 1000 MG: 500 TABLET ORAL at 12:36

## 2023-11-18 RX ADMIN — SIMETHICONE 80 MG: 80 TABLET, CHEWABLE ORAL at 03:07

## 2023-11-18 RX ADMIN — IBUPROFEN 800 MG: 800 TABLET, FILM COATED ORAL at 12:35

## 2023-11-18 RX ADMIN — SIMETHICONE 80 MG: 80 TABLET, CHEWABLE ORAL at 12:36

## 2023-11-18 RX ADMIN — OXYCODONE 10 MG: 5 TABLET ORAL at 14:47

## 2023-11-18 RX ADMIN — OXYCODONE 10 MG: 5 TABLET ORAL at 10:38

## 2023-11-18 RX ADMIN — ACETAMINOPHEN 1000 MG: 500 TABLET ORAL at 05:14

## 2023-11-18 RX ADMIN — Medication: at 12:36

## 2023-11-18 RX ADMIN — SIMETHICONE 80 MG: 80 TABLET, CHEWABLE ORAL at 10:38

## 2023-11-18 RX ADMIN — DOCUSATE SODIUM 100 MG: 100 CAPSULE, LIQUID FILLED ORAL at 10:38

## 2023-11-18 ASSESSMENT — PAIN - FUNCTIONAL ASSESSMENT
PAIN_FUNCTIONAL_ASSESSMENT: ACTIVITIES ARE NOT PREVENTED

## 2023-11-18 ASSESSMENT — PAIN DESCRIPTION - LOCATION
LOCATION: INCISION
LOCATION: INCISION
LOCATION: ABDOMEN;INCISION
LOCATION: INCISION

## 2023-11-18 ASSESSMENT — PAIN DESCRIPTION - DESCRIPTORS
DESCRIPTORS: DISCOMFORT;SORE;TENDER
DESCRIPTORS: SORE
DESCRIPTORS: DISCOMFORT;SORE;TENDER
DESCRIPTORS: DISCOMFORT;SORE;TENDER

## 2023-11-18 ASSESSMENT — PAIN DESCRIPTION - ORIENTATION
ORIENTATION: LOWER

## 2023-11-18 ASSESSMENT — PAIN SCALES - GENERAL
PAINLEVEL_OUTOF10: 7
PAINLEVEL_OUTOF10: 9
PAINLEVEL_OUTOF10: 7

## 2023-11-18 NOTE — PROGRESS NOTES
Pt states ready for discharge;discharge instructions given w/ understanding verbalized; pt denies any questions; pt discharged in apparently stable condition to home w/ baby.

## 2023-11-18 NOTE — PLAN OF CARE
Problem: Pain  Goal: Verbalizes/displays adequate comfort level or baseline comfort level  Outcome: Progressing  Problem: Postpartum  Goal: Establishment of infant feeding pattern  Description:  Postpartum OB-Pregnancy care plan goal which identifies if the mother is establishing a feeding pattern with their   Outcome: Progressing     Problem: Postpartum  Goal: Incisions, wounds, or drain sites healing without S/S of infection  Outcome: Progressing     Problem: Infection - Adult  Goal: Absence of infection at discharge  Outcome: Progressing     Problem: Infection - Adult  Goal: Absence of infection during hospitalization  Outcome: Progressing     Problem: Infection - Adult  Goal: Absence of fever/infection during anticipated neutropenic period  Outcome: Progressing     Problem: Safety - Adult  Goal: Free from fall injury  Outcome: Progressing     Problem: Discharge Planning  Goal: Discharge to home or other facility with appropriate resources  Outcome: Progressing        Problem: Postpartum  Goal: Experiences normal postpartum course  Description:  Postpartum OB-Pregnancy care plan goal which identifies if the mother is experiencing a normal postpartum course  Outcome: Progressing     Problem: Postpartum  Goal: Appropriate maternal -  bonding  Description:  Postpartum OB-Pregnancy care plan goal which identifies if the mother and  are bonding appropriately  Outcome: Progressing

## 2023-11-18 NOTE — DISCHARGE INSTRUCTIONS
follow-up with your OB doctor in 1-2 weeks if  delivery  unless otherwise instructed. Call office for an appointment. For breastfeeding support, you can contact our lactation nurse [phone], or [phone]. DIET  Eat a well balanced diet focusing on foods high in fiber and protein  Drink plenty of fluids especially water. To avoid constipation you may take a mild stool softener as recommended by your doctor or midwife. ACTIVITY  Gradually increase your activity. Resume exercise regimen only after advised by your doctor or midwife. Avoid lifting anything heavier than your baby or a gallon of milk for SIX weeks. Avoid driving until your doctor or midwife has given their approval.  Krystyna Rook slowly from a lying to sitting and then a standing position. Climb stairs one at a time. Use caution when carrying your baby up and down the stairs. No sexual activity for 6 weeks or until advised by your doctor - Nothing in vagina: intercourse, tampons, or douching. Be prepared to discuss family planning at your follow-up OB visit. You may feel tired or have a lack of energy. You may continue your prenatal vitamin to replenish nutrients post delivery. Nap when baby naps to catch up on sleep. May return to work or school in 6 weeks or as directed by OB. EMOTIONS  You may feed stevens, sad, teary, & overwhelmed. Contact your OB provider if you feel you may be showing signs of postpartum depression, or have thoughts of harming yourself or your infant. If infant will not stop crying, contact another adult for help or place infant in their crib on their back and take a break. NEVER shake your infant. BLEEDING  Vaginal bleeding will decrease in amount over the next few weeks. You will notice that as your activity increases, your flow may increase. This is your body's way of telling you, you need to take things easier and rest more often.   Call your OB/ER if you are saturating more than one maxi pad in an hour. BREAST CARE  Take medications as recommended by your doctor or midwife for pain  If you develop a warm, red, tender area on your breast or develop a fever contact your OB provider. For breastfeeding moms:  If you become engorged, feeding may be more difficult or painful for 1-2 days. You may find it helpful to hand express some milk so that the infant can latch on more easily. While breastfeeding, continue to take your prenatal vitamins as directed by your doctor or midwife. Only take medications verified as safe for breastfeeding. For non-breastfeeding moms:           If you develop a warm, red, tender area on your breast or develop a fever contact         your OB provider. You may apply ice packs to your breasts over your bra for twenty minutes at a time for comfort. Avoid stimulation to your breasts, when showering allow the water to strike your back not your breasts. Wear a good fitting bra until your milk dries, such as a sports bra. INCISIONAL CARE / TALI CARE  Clean your incision in the shower with mild soap. After shower pat the incision area dry and leave open to air. If used, Steri-stipes should be removed by 2 weeks. Use the tali-bottle after toileting until bleeding stops. Cleanse your perineum from front to back  If used, stitches or internal clips will dissolve in 4-6 weeks. Kegel exercises will help restore bladder control. SWELLING  Keep your legs elevated when sitting or lying. When wearing stocking or socks, make sure they are not too tight. WHEN TO CALL THE DOCTOR  If you have a temp of 100.4 or more. If your bleeding has increased and you are saturating a pad in an hour. Your abdomen is tender to touch. You are passing blood clots bigger than the size of a lemon. If you are experiencing extreme weakness or dizziness. If you are having flu-like symptoms such as achy muscles or joints.   There is a foul smell

## 2023-11-18 NOTE — DISCHARGE INSTR - ACTIVITY
After Your Delivery Discharge Instructions    What to do after you leave the hospital:  Recommended diet: regular diet    Recommended activity: No driving for 2 weeks. No sex or tampon use for 6 weeks. No douching. No heavy lifting (greater than baby and carrier) for 6 weeks. Initially, avoid frequent ambulation with stairs - start slowly then increase as tolerated. You may return to work in 10 -8 weeks. Showers are fine - avoid bath tubs, hot tubs, swimming. Follow-up in 1 week for incision check and in 6 weeks for final postpartum visit. Call the Physician with any of these  signs and symptoms:    Warning signs regarding incision:  \"Popping\" of stitches or staples  Foul smelling discharge or pus  More redness or streaks around surgical incision than before    Incision care:  Keep incision uncovered  No tub baths until OK'd by your Physician      After your delivery - signs and symptoms to watch for:  Fever - Oral temperature greater than 100.4 degrees Fahrenheit  Foul-smelling vaginal discharge  Headache unrelieved by \"pain meds\"  Difficulty urinating  Breasts reddened, hard, hot to the touch  Nipple discharge which is foul-smelling or contains pus  Increased pain at the site of the surgical incision  Difficulty breathing with or without chest pain  New calf pain especially if only on one side  Sudden, continuing increased vaginal bleeding (heavier than a period) with or without clots  Unrelieved feelings of:   Inability to cope  Sadness  Anxiety  Lack of interest in baby  Insomnia  Crying     What to do at home:  Resume activity gradually   Don't lift anything heavier than baby and carrier until OK'd by your Physician   No sex until OK'd by your Physician  Eat regular nutritious meals  Take pain medication as prescribed whenever you need them  To avoid/relieve constipation take stool softeners if advised   Increase fiber in your diet    Most importantly ENJOY your Baby!  -     Please call immediately with Questions and Concerns -   By signing below, I understand that if any emergent problems occur, once I leave the hospital, I am to dial #[phone] or go immediately to the nearest Emergency Room. For less emergent matters,  .  I understand and acknowledge receipt of the instructions indicated above.

## 2023-11-18 NOTE — DISCHARGE SUMMARY
Department of Obstetrics and Gynecology  Labor and Delivery  Discharge Summary    Patient:  Marta Malik         Medical Record Number:  [id number]    Admit Date:  11/15/2023  9:43 AM    Discharge Date: 2023    Final Diagnosis:   Principal Problem (Resolved):    Term pregnancy  Active Problems:    Term birth of  female    Delivery of pregnancy by  section      Chief Complaint - Presenting Illness - Physical Findings:   Previous  section [Z98.891]  Term pregnancy [Z34.90]  HPI: came in for repeat c/s scheduled. GDM on metformin, hx graves disease. Hospital Course:   Delivery Summary:  Manpreetrobertoroland Bina [[id number]]      Labor Events     Labor: No  Cervical Ripening Date/Time:      Antibiotics Received during Labor: No  Rupture Date/Time:  11/15/23 12:29:00   Rupture Type: AROM  Fluid Color: Clear  Fluid Odor: None  Fluid Volume:  Moderate  Augmentation: None  Labor Complications: None              Anesthesia    Method: Spinal       Labor Length    3rd stage: 0h 01m       Delivery Details      Delivery Date: 11/15/23 Delivery Time: 12:29:00   Delivery Type: , Low Transverse  Trial of Labor?: No   Categorization: Repeat   Priority: scheduled  Indications for : Prior Uterine Surgery       Skin Incision Type: Low Transverse  Uterine Incision: Low Transverse       [place] Presentation    Presentation: Vertex  _: Occiput       Shoulder Dystocia    Shoulder Dystocia Present?: No                                                                                                           Assisted Delivery Details    Forceps Attempted?: No  Vacuum Extractor Attempted?: No                                                             Cord    Vessels: 3 Vessels  Complications: None  Delayed Cord Clamping?: Yes  Cord Clamped Date/Time: 11/15/2023 12:30:00  Cord Blood Disposition: Lab  Gases Sent?: No              Placenta    Date/Time: 11/15/2023 evening, and at bedtime  Qty: 100 each, Refills: 4      !! glucose monitoring (FREESTYLE FREEDOM) kit 1 kit by Does not apply route daily  Qty: 1 kit, Refills: 0    Associated Diagnoses: Gestational diabetes mellitus (GDM) in second trimester, gestational diabetes method of control unspecified      !! Lancets MISC 1 each by Does not apply route 4 times daily  Qty: 200 each, Refills: 0    Associated Diagnoses: Gestational diabetes mellitus (GDM) in second trimester, gestational diabetes method of control unspecified      Prenatal MV-Min-Fe Fum-FA-DHA (PRENATAL 1 PO) Take by mouth       !! - Potential duplicate medications found. Please discuss with provider. STOP taking these medications       metFORMIN (GLUCOPHAGE-XR) 500 MG extended release tablet Comments:   Reason for Stopping:         blood glucose monitor strips Comments:   Reason for Stopping:         blood glucose monitor strips Comments:   Reason for Stopping:         cyanocobalamin 100 MCG tablet Comments:   Reason for Stopping:         albuterol sulfate HFA (PROVENTIL;VENTOLIN;PROAIR) 108 (90 Base) MCG/ACT inhaler Comments:   Reason for Stopping:         Garlic Oil 2398 MG CAPS Comments:   Reason for Stopping: Follow-Up Visit Plan: In 1 week for incision check and in 6-8  weeks for final postpartum visit. Disposition: Home    Time Spent on Discharge:  30 minutes were spent in patient examination, evaluation, counseling as well as medication reconciliation, prescriptions for required medications, discharge plan and follow up.       DEMETRA Poole - VANESSA   11/18/2023 7:58 AM

## 2023-11-30 ENCOUNTER — POSTPARTUM VISIT (OUTPATIENT)
Age: 33
End: 2023-11-30

## 2023-11-30 VITALS
WEIGHT: 241 LBS | BODY MASS INDEX: 41.37 KG/M2 | HEART RATE: 101 BPM | SYSTOLIC BLOOD PRESSURE: 117 MMHG | DIASTOLIC BLOOD PRESSURE: 66 MMHG

## 2023-11-30 DIAGNOSIS — Z09 POSTOP CHECK: Primary | ICD-10-CM

## 2023-11-30 PROCEDURE — 0503F POSTPARTUM CARE VISIT: CPT | Performed by: OBSTETRICS & GYNECOLOGY

## 2023-11-30 NOTE — PROGRESS NOTES
Belle Polanco is a 33-year-old  who underwent a repeat LTCS on 11- at 38 weeks 5 days delivering an 8 pound 11 ounce infant female denies any fever, chills, nausea, vomiting, diarrhea or constipation. No dysuria urgency or frequency. Minimal vaginal bleeding. Not requiring any pain medication. She is breast-feeding. Her breasts are not red hot or tender.   Recently saw endocrinologist hemoglobin A1c 5.2 and had a glucose tolerance test.    Patient presents for incision check    Past Medical History:   Diagnosis Date    Herpes simplex virus (HSV) infection     History of pregnancy induced hypertension     hx ofGestational diabetes     Hypertension     Hyperthyroidism         Past Surgical History:   Procedure Laterality Date     SECTION      X2     SECTION N/A 11/15/2023     SECTION performed by Henry Alexandre DO at [facility] L&D OR    DILATION AND CURETTAGE      ECTOPIC PREGNANCY SURGERY          Family History   Problem Relation Age of Onset    Diabetes Father     Cancer Maternal Grandfather     Heart Attack Maternal Grandfather     Heart Attack Paternal Grandfather         Social History       Tobacco History       Smoking Status  Former Smoking Tobacco Type  Cigarettes      Smokeless Tobacco Use  Former              Alcohol History       Alcohol Use Status  Not Currently Drinks/Week  0 Glasses of wine, 0 Cans of beer, 0 Shots of liquor, 0 Drinks containing 0.5 oz of alcohol per week              Drug Use       Drug Use Status  Never              Sexual Activity       Sexually Active  Yes Partners  Male Comment                  ROS as in HPI      Current Outpatient Medications:     ibuprofen (ADVIL;MOTRIN) 800 MG tablet, Take 1 tablet by mouth every 6 hours, Disp: 120 tablet, Rfl: 3    docusate sodium (COLACE, DULCOLAX) 100 MG CAPS, Take 100 mg by mouth 2 times daily, Disp: 60 capsule, Rfl: 1    ferrous sulfate (FE TABS) 325 (65 Fe) MG EC tablet, Take 1 tablet by

## 2024-01-04 ENCOUNTER — OFFICE VISIT (OUTPATIENT)
Age: 34
End: 2024-01-04

## 2024-01-04 VITALS
BODY MASS INDEX: 40.51 KG/M2 | HEART RATE: 88 BPM | WEIGHT: 236 LBS | DIASTOLIC BLOOD PRESSURE: 80 MMHG | SYSTOLIC BLOOD PRESSURE: 115 MMHG

## 2024-01-04 PROBLEM — O24.415 GESTATIONAL DIABETES MELLITUS (GDM) IN SECOND TRIMESTER CONTROLLED ON ORAL HYPOGLYCEMIC DRUG: Status: RESOLVED | Noted: 2023-07-05 | Resolved: 2024-01-04

## 2024-01-04 ASSESSMENT — ENCOUNTER SYMPTOMS
NAUSEA: 0
VOMITING: 0
DIARRHEA: 1
CONSTIPATION: 1
COUGH: 0
ABDOMINAL PAIN: 0
BLOOD IN STOOL: 0
SHORTNESS OF BREATH: 0
WHEEZING: 0

## 2024-01-04 NOTE — PROGRESS NOTES
Patient is here for 6 wk postpartum visit.   
Bartholin's cyst.     Left Labia: No Bartholin's cyst.     No vaginal discharge or bleeding.      No vaginal prolapse present.     No vaginal atrophy present.       Right Adnexa: not tender and no mass present.     Left Adnexa: not tender and no mass present.     No cervical discharge, friability or lesion.      Uterus is not enlarged, tender, irregular or prolapsed.      No urethral prolapse present.      Levator ani not tender and obturator internus not tender.  Breasts:     Right: No inverted nipple, mass, nipple discharge, skin change or tenderness.      Left: No inverted nipple, mass, nipple discharge, skin change or tenderness.   Neck:      Thyroid: No thyroid mass or thyromegaly.      Vascular: No carotid bruit.   Cardiovascular:      Rate and Rhythm: Normal rate and regular rhythm.   Pulmonary:      Effort: Pulmonary effort is normal.      Breath sounds: Normal breath sounds.   Abdominal:      Palpations: Abdomen is soft. There is no mass.      Tenderness: There is no abdominal tenderness.      Hernia: No hernia is present.   Lymphadenopathy:      Cervical: No cervical adenopathy.      Upper Body:      Right upper body: No supraclavicular or axillary adenopathy.      Left upper body: No supraclavicular or axillary adenopathy.   Neurological:      Mental Status: She is alert.   Skin:     General: Skin is warm.   Psychiatric:         Mood and Affect: Mood normal.   Vitals reviewed.        Mahsa was seen today for postpartum care.    Diagnoses and all orders for this visit:    Postpartum care and examination      We discussed the risk of early pregnancy following a  section.  We recommend at least 1 year.  We reviewed that with increasing  section risk increases for placenta accreta.  Encourage calcium vitamin D weightbearing exercise.  No Pap indicated today A.    Return in about 1 year (around 2025) for annual.     Morenita Muro DO

## 2024-05-17 ENCOUNTER — TELEPHONE (OUTPATIENT)
Age: 34
End: 2024-05-17

## 2024-05-17 NOTE — TELEPHONE ENCOUNTER
Pt called in stating she currently has a tooth infection and her dentist wants to now if she is able to take Augmentin 500mg and wants to know if this is okay to take while breast feeding. Please advise?

## 2024-07-08 NOTE — PROGRESS NOTES
Discharge instructions given to patient and verbalizes understanding. Patient ambulated off unit independently with significant other. Patient calling stating that the last time he was seen Dr. Bhakta mentions a growth on his kidney. He would like to know if this needs to be further investigated or if he should be concerned. Please call patient at [phone].

## 2024-12-30 ENCOUNTER — TELEPHONE (OUTPATIENT)
Dept: OBGYN | Age: 34
End: 2024-12-30

## 2024-12-30 NOTE — TELEPHONE ENCOUNTER
Patient not sure when LMP , states she has missed 2 periods, positive 12/29/24. Patient states she usually gets gestational diabetes.  No soon appointments available.  Please contact patient to advise.

## 2024-12-31 DIAGNOSIS — N91.2 AMENORRHEA: Primary | ICD-10-CM

## 2025-01-06 ENCOUNTER — TELEPHONE (OUTPATIENT)
Dept: OBGYN | Age: 35
End: 2025-01-06

## 2025-01-06 NOTE — TELEPHONE ENCOUNTER
Called patient, left her a message on her home phone that orders were placed when she called last time. She needs to get labs and sono done first and we will go from there.

## 2025-01-06 NOTE — TELEPHONE ENCOUNTER
Patient states her LMP was some time in October.  She is a patient of Dr. Muro and would like to see her again this pregnancy.  She has a HX of gestational diabetes and graves disease.     I explained that Dr. Muro is booking out into April of this year and patient asked that I send an encounter to the Nurse and Dr. uMro to see if there was anyway that she could see her this pregnancy.  She has had no prenatal care so far.

## 2025-01-07 ENCOUNTER — HOSPITAL ENCOUNTER (OUTPATIENT)
Age: 35
Discharge: HOME | End: 2025-01-07
Payer: COMMERCIAL

## 2025-01-07 DIAGNOSIS — N91.2 AMENORRHEA: ICD-10-CM

## 2025-01-07 DIAGNOSIS — N91.2 AMENORRHEA: Primary | ICD-10-CM

## 2025-01-07 LAB — B-HCG SERPL EIA 3RD IS-ACNC: ABNORMAL MIU/ML (ref 0–7)

## 2025-01-07 PROCEDURE — 84702 CHORIONIC GONADOTROPIN TEST: CPT

## 2025-01-07 PROCEDURE — 36415 COLL VENOUS BLD VENIPUNCTURE: CPT

## 2025-01-08 ENCOUNTER — TELEPHONE (OUTPATIENT)
Dept: OBGYN | Age: 35
End: 2025-01-08

## 2025-01-08 NOTE — TELEPHONE ENCOUNTER
----- Message from Dr. Morenita Muro DO sent at 1/7/2025  1:07 PM EST -----  Placed order for MFM ultrasound dates and viability and consult with MFM secondary to 3 previous C-sections AMA and obesity

## 2025-01-15 ENCOUNTER — INITIAL PRENATAL (OUTPATIENT)
Dept: OBGYN CLINIC | Age: 35
End: 2025-01-15
Payer: COMMERCIAL

## 2025-01-15 ENCOUNTER — ANCILLARY PROCEDURE (OUTPATIENT)
Dept: OBGYN CLINIC | Age: 35
End: 2025-01-15
Payer: COMMERCIAL

## 2025-01-15 VITALS
TEMPERATURE: 98.8 F | BODY MASS INDEX: 39.44 KG/M2 | HEIGHT: 64 IN | SYSTOLIC BLOOD PRESSURE: 121 MMHG | HEART RATE: 99 BPM | RESPIRATION RATE: 12 BRPM | WEIGHT: 231 LBS | DIASTOLIC BLOOD PRESSURE: 84 MMHG

## 2025-01-15 DIAGNOSIS — Z36.9 ENCOUNTER FOR FETAL ULTRASOUND: ICD-10-CM

## 2025-01-15 DIAGNOSIS — Z78.9 DATE OF LAST MENSTRUAL PERIOD (LMP) UNKNOWN: ICD-10-CM

## 2025-01-15 DIAGNOSIS — O09.529 ANTEPARTUM MULTIGRAVIDA OF ADVANCED MATERNAL AGE: Primary | ICD-10-CM

## 2025-01-15 DIAGNOSIS — O36.80X0 ENCOUNTER TO DETERMINE FETAL VIABILITY OF PREGNANCY, SINGLE OR UNSPECIFIED FETUS: ICD-10-CM

## 2025-01-15 DIAGNOSIS — O34.219 PREVIOUS CESAREAN SECTION COMPLICATING PREGNANCY, ANTEPARTUM CONDITION OR COMPLICATION: ICD-10-CM

## 2025-01-15 DIAGNOSIS — E05.00 GRAVES' DISEASE: ICD-10-CM

## 2025-01-15 LAB
GLUCOSE URINE, POC: NORMAL MG/DL
PROTEIN UA: NEGATIVE

## 2025-01-15 PROCEDURE — 76801 OB US < 14 WKS SINGLE FETUS: CPT | Performed by: OBSTETRICS & GYNECOLOGY

## 2025-01-15 PROCEDURE — 81002 URINALYSIS NONAUTO W/O SCOPE: CPT | Performed by: OBSTETRICS & GYNECOLOGY

## 2025-01-15 PROCEDURE — 99203 OFFICE O/P NEW LOW 30 MIN: CPT | Performed by: OBSTETRICS & GYNECOLOGY

## 2025-01-15 PROCEDURE — 99999 PR OFFICE/OUTPT VISIT,PROCEDURE ONLY: CPT | Performed by: OBSTETRICS & GYNECOLOGY

## 2025-01-15 PROCEDURE — 76817 TRANSVAGINAL US OBSTETRIC: CPT | Performed by: OBSTETRICS & GYNECOLOGY

## 2025-01-15 RX ORDER — PROPYLTHIOURACIL 50 MG/1
50 TABLET ORAL 2 TIMES DAILY
COMMUNITY

## 2025-01-15 NOTE — PROGRESS NOTES
1/15/25    Morenita Muro, DO  [address] [address]  [address] [place] [address]     RE:  MAHSA VALENTE  : 1990   AGE: 34 y.o.      Dear Dr. Muro:    Mahsa Hartmannntz was scheduled for a pregnancy ultrasound assessment only.  A comprehensive ultrasound report is included under the imaging tab from today's date.    Transvaginal and transabdominal ultrasound imaging were performed.  A living patton intrauterine embryo was identified in variable presentation, with normal heart motion noted.  The amniotic fluid volume was within normal limits.  A yolk sac was visualized.  Visualization of the embryonic anatomy was limited secondary to the early gestational age.  There was no significant free peritoneal fluid.  There were no apparent uterine abnormalities.  There were no apparent adnexal masses identified.    A first trimester ultrasound evaluation of the 11 to 14 weeks is recommended.  A comprehensive evaluation of fetal anatomy and growth is recommended at approximately 20 weeks gestational age.  A follow-up ultrasound evaluation of fetal anatomy and growth is recommended in the third trimester of pregnancy, unless there is a clinical indication to repeat the evaluation prior to that time.    No further follow-up appointments have been scheduled in our office, however, we would be happy to see your patient at any time if you request.      If you have any questions regarding her management, please contact me at your convenience and thank you for allowing me to participate in her care    Sincerely,        Abdulkadir Cummings MD, MS, FACOG, FACS, RDCS, RDMS, RVT  Director Maternal-Fetal Medicine  [facility]  [phone]

## 2025-01-15 NOTE — PROGRESS NOTES
Patient is here today for ob new visit. Had some light pink vaginal spotting last week and mucus as well. Mild cramping.

## 2025-02-18 ENCOUNTER — HOSPITAL ENCOUNTER (EMERGENCY)
Age: 35
Discharge: HOME | End: 2025-02-19
Attending: EMERGENCY MEDICINE
Payer: COMMERCIAL

## 2025-02-18 DIAGNOSIS — R00.2 PALPITATIONS: Primary | ICD-10-CM

## 2025-02-18 DIAGNOSIS — I10 UNCONTROLLED HYPERTENSION: ICD-10-CM

## 2025-02-18 LAB
ANION GAP SERPL CALCULATED.3IONS-SCNC: 9 MMOL/L (ref 7–16)
BILIRUB UR QL STRIP: NEGATIVE
BUN SERPL-MCNC: 8 MG/DL (ref 6–20)
CALCIUM SERPL-MCNC: 8.5 MG/DL (ref 8.6–10.2)
CHLORIDE SERPL-SCNC: 101 MMOL/L (ref 98–107)
CLARITY UR: CLEAR
CO2 SERPL-SCNC: 23 MMOL/L (ref 22–29)
COLOR UR: YELLOW
CREAT SERPL-MCNC: 0.5 MG/DL (ref 0.5–1)
EPI CELLS #/AREA URNS HPF: NORMAL /HPF
ERYTHROCYTE [DISTWIDTH] IN BLOOD BY AUTOMATED COUNT: 12.7 % (ref 11.5–15)
GFR, ESTIMATED: >90 ML/MIN/1.73M2
GLUCOSE SERPL-MCNC: 137 MG/DL (ref 74–99)
GLUCOSE UR STRIP-MCNC: NEGATIVE MG/DL
HCT VFR BLD AUTO: 37.7 % (ref 34–48)
HGB BLD-MCNC: 12.9 G/DL (ref 11.5–15.5)
HGB UR QL STRIP.AUTO: NEGATIVE
KETONES UR STRIP-MCNC: NEGATIVE MG/DL
LEUKOCYTE ESTERASE UR QL STRIP: NEGATIVE
MCH RBC QN AUTO: 31.2 PG (ref 26–35)
MCHC RBC AUTO-ENTMCNC: 34.2 G/DL (ref 32–34.5)
MCV RBC AUTO: 91.3 FL (ref 80–99.9)
NITRITE UR QL STRIP: NEGATIVE
PH UR STRIP: 6 [PH] (ref 5–8)
PLATELET # BLD AUTO: 266 K/UL (ref 130–450)
PMV BLD AUTO: 9 FL (ref 7–12)
POTASSIUM SERPL-SCNC: 3.4 MMOL/L (ref 3.5–5)
PROT UR STRIP-MCNC: NEGATIVE MG/DL
RBC # BLD AUTO: 4.13 M/UL (ref 3.5–5.5)
RBC #/AREA URNS HPF: NORMAL /HPF
SODIUM SERPL-SCNC: 133 MMOL/L (ref 132–146)
SP GR UR STRIP: 1.01 (ref 1–1.03)
T4 FREE SERPL-MCNC: 1 NG/DL (ref 0.9–1.7)
TROPONIN I SERPL HS-MCNC: <6 NG/L (ref 0–9)
TSH SERPL DL<=0.05 MIU/L-ACNC: 1.91 UIU/ML (ref 0.27–4.2)
UROBILINOGEN UR STRIP-ACNC: 0.2 EU/DL (ref 0–1)
WBC #/AREA URNS HPF: NORMAL /HPF
WBC OTHER # BLD: 10.3 K/UL (ref 4.5–11.5)

## 2025-02-18 PROCEDURE — 85027 COMPLETE CBC AUTOMATED: CPT

## 2025-02-18 PROCEDURE — 80048 BASIC METABOLIC PNL TOTAL CA: CPT

## 2025-02-18 PROCEDURE — 81001 URINALYSIS AUTO W/SCOPE: CPT

## 2025-02-18 PROCEDURE — 93005 ELECTROCARDIOGRAM TRACING: CPT | Performed by: EMERGENCY MEDICINE

## 2025-02-18 PROCEDURE — 84443 ASSAY THYROID STIM HORMONE: CPT

## 2025-02-18 PROCEDURE — 99284 EMERGENCY DEPT VISIT MOD MDM: CPT

## 2025-02-18 PROCEDURE — 84439 ASSAY OF FREE THYROXINE: CPT

## 2025-02-18 PROCEDURE — 84484 ASSAY OF TROPONIN QUANT: CPT

## 2025-02-18 RX ORDER — CALCIUM GLUCONATE 20 MG/ML
1000 INJECTION, SOLUTION INTRAVENOUS ONCE
Status: COMPLETED | OUTPATIENT
Start: 2025-02-18 | End: 2025-02-19

## 2025-02-18 RX ORDER — POTASSIUM CHLORIDE 1500 MG/1
40 TABLET, EXTENDED RELEASE ORAL ONCE
Status: COMPLETED | OUTPATIENT
Start: 2025-02-18 | End: 2025-02-19

## 2025-02-18 ASSESSMENT — ENCOUNTER SYMPTOMS
EYE DISCHARGE: 0
WHEEZING: 0
BACK PAIN: 0
NAUSEA: 0
EYE REDNESS: 0
COUGH: 0
SORE THROAT: 0
EYE PAIN: 0
SINUS PRESSURE: 0
ABDOMINAL DISTENTION: 0
DIARRHEA: 0
VOMITING: 0
SHORTNESS OF BREATH: 0

## 2025-02-18 ASSESSMENT — LIFESTYLE VARIABLES
HOW MANY STANDARD DRINKS CONTAINING ALCOHOL DO YOU HAVE ON A TYPICAL DAY: PATIENT DOES NOT DRINK
HOW OFTEN DO YOU HAVE A DRINK CONTAINING ALCOHOL: NEVER

## 2025-02-19 ENCOUNTER — TELEPHONE (OUTPATIENT)
Age: 35
End: 2025-02-19

## 2025-02-19 VITALS
OXYGEN SATURATION: 99 % | DIASTOLIC BLOOD PRESSURE: 72 MMHG | RESPIRATION RATE: 16 BRPM | SYSTOLIC BLOOD PRESSURE: 142 MMHG | HEART RATE: 78 BPM | TEMPERATURE: 98.2 F

## 2025-02-19 LAB
EKG ATRIAL RATE: 96 BPM
EKG P AXIS: 76 DEGREES
EKG P-R INTERVAL: 148 MS
EKG Q-T INTERVAL: 334 MS
EKG QRS DURATION: 96 MS
EKG QTC CALCULATION (BAZETT): 421 MS
EKG R AXIS: 69 DEGREES
EKG T AXIS: -4 DEGREES
EKG VENTRICULAR RATE: 96 BPM

## 2025-02-19 PROCEDURE — 93010 ELECTROCARDIOGRAM REPORT: CPT | Performed by: INTERNAL MEDICINE

## 2025-02-19 PROCEDURE — 96365 THER/PROPH/DIAG IV INF INIT: CPT

## 2025-02-19 PROCEDURE — 6360000002 HC RX W HCPCS: Performed by: EMERGENCY MEDICINE

## 2025-02-19 PROCEDURE — 6370000000 HC RX 637 (ALT 250 FOR IP): Performed by: EMERGENCY MEDICINE

## 2025-02-19 RX ADMIN — CALCIUM GLUCONATE 1000 MG: 20 INJECTION, SOLUTION INTRAVENOUS at 00:02

## 2025-02-19 RX ADMIN — POTASSIUM CHLORIDE 40 MEQ: 1500 TABLET, EXTENDED RELEASE ORAL at 00:01

## 2025-02-19 NOTE — ED PROVIDER NOTES
Patient is a 35 y/o female who presents to the ED via EMS with palpitations. Patient states that she was at home when she had onset of palpitations. She states that her heart was racing. She denies any chest pain or shortness of breath. She states that his usually happens when her thyroid is messed up. She has a history of Graves disease. She took her propranolol prior to coming to the ED. She is currently approximately 3 months pregnant. She denies any abdominal pain or vaginal bleeding.         Review of Systems   Constitutional:  Negative for chills and fever.   HENT:  Negative for ear pain, sinus pressure and sore throat.    Eyes:  Negative for pain, discharge and redness.   Respiratory:  Negative for cough, shortness of breath and wheezing.    Cardiovascular:  Positive for palpitations. Negative for chest pain.   Gastrointestinal:  Negative for abdominal distention, diarrhea, nausea and vomiting.   Genitourinary:  Negative for dysuria and frequency.   Musculoskeletal:  Negative for arthralgias and back pain.   Skin:  Negative for rash and wound.   Neurological:  Negative for weakness and headaches.   Hematological:  Negative for adenopathy.   All other systems reviewed and are negative.       Physical Exam  Vitals and nursing note reviewed.   Constitutional:       General: She is not in acute distress.     Appearance: She is obese.   HENT:      Head: Normocephalic and atraumatic.      Right Ear: External ear normal.      Left Ear: External ear normal.      Nose: Nose normal.      Mouth/Throat:      Mouth: Mucous membranes are moist.   Eyes:      Conjunctiva/sclera: Conjunctivae normal.      Pupils: Pupils are equal, round, and reactive to light.   Cardiovascular:      Rate and Rhythm: Regular rhythm. Tachycardia present.      Heart sounds: No murmur heard.  Pulmonary:      Effort: Pulmonary effort is normal. No respiratory distress.      Breath sounds: Normal breath sounds. No stridor. No wheezing, rhonchi or

## 2025-02-19 NOTE — TELEPHONE ENCOUNTER
Pt left  requesting OB follow up from hospital ED. I believe she contacted the [place] office in error as it looks like Dr Muro is her current OB and referred her to [facility]. Notified pt via  if she wishes to schedule in our office with Dr Frost who only delivers at [facility] in [place] she may return the call for a NEW OB.

## 2025-02-26 ENCOUNTER — INITIAL PRENATAL (OUTPATIENT)
Dept: OBGYN | Age: 35
End: 2025-02-26
Payer: COMMERCIAL

## 2025-02-26 VITALS
TEMPERATURE: 99 F | RESPIRATION RATE: 20 BRPM | DIASTOLIC BLOOD PRESSURE: 70 MMHG | SYSTOLIC BLOOD PRESSURE: 106 MMHG | BODY MASS INDEX: 39.18 KG/M2 | HEIGHT: 64 IN | OXYGEN SATURATION: 98 % | WEIGHT: 229.5 LBS | HEART RATE: 77 BPM

## 2025-02-26 DIAGNOSIS — Z3A.12 12 WEEKS GESTATION OF PREGNANCY: Primary | ICD-10-CM

## 2025-02-26 DIAGNOSIS — O09.91 PREGNANCY, SUPERVISION, HIGH-RISK, FIRST TRIMESTER: ICD-10-CM

## 2025-02-26 LAB
GLUCOSE URINE, POC: NORMAL MG/DL
PROTEIN UA: NEGATIVE

## 2025-02-26 PROCEDURE — 0500F INITIAL PRENATAL CARE VISIT: CPT | Performed by: OBSTETRICS & GYNECOLOGY

## 2025-02-26 PROCEDURE — 81002 URINALYSIS NONAUTO W/O SCOPE: CPT | Performed by: OBSTETRICS & GYNECOLOGY

## 2025-02-26 PROCEDURE — 99213 OFFICE O/P EST LOW 20 MIN: CPT | Performed by: OBSTETRICS & GYNECOLOGY

## 2025-02-26 RX ORDER — PROPRANOLOL HYDROCHLORIDE 60 MG/1
60 CAPSULE, EXTENDED RELEASE ORAL DAILY
COMMUNITY
Start: 2025-01-28

## 2025-02-26 RX ORDER — POTASSIUM GLUCONATE 595(99)MG
595 TABLET, EXTENDED RELEASE ORAL 3 TIMES DAILY
COMMUNITY

## 2025-02-26 SDOH — ECONOMIC STABILITY: FOOD INSECURITY: WITHIN THE PAST 12 MONTHS, YOU WORRIED THAT YOUR FOOD WOULD RUN OUT BEFORE YOU GOT MONEY TO BUY MORE.: NEVER TRUE

## 2025-02-26 SDOH — ECONOMIC STABILITY: FOOD INSECURITY: WITHIN THE PAST 12 MONTHS, THE FOOD YOU BOUGHT JUST DIDN'T LAST AND YOU DIDN'T HAVE MONEY TO GET MORE.: NEVER TRUE

## 2025-02-26 ASSESSMENT — PATIENT HEALTH QUESTIONNAIRE - PHQ9
SUM OF ALL RESPONSES TO PHQ QUESTIONS 1-9: 0
SUM OF ALL RESPONSES TO PHQ9 QUESTIONS 1 & 2: 0
SUM OF ALL RESPONSES TO PHQ QUESTIONS 1-9: 0
SUM OF ALL RESPONSES TO PHQ QUESTIONS 1-9: 0
1. LITTLE INTEREST OR PLEASURE IN DOING THINGS: NOT AT ALL
2. FEELING DOWN, DEPRESSED OR HOPELESS: NOT AT ALL
SUM OF ALL RESPONSES TO PHQ QUESTIONS 1-9: 0

## 2025-02-26 NOTE — PROGRESS NOTES
Initial prenatal visit with Mahsa she is a 34-year-old  previous C-sections at 13 weeks confirmed with ultrasound.  History of gestational hypertension and diabetes.  Currently off her medicines for hyperthyroidism.  They are having her continue the propranolol for the tachycardia.  Follows with endocrine.  Recent hospital visit for tachycardia.  We reviewed first trimester changes  Declined panorama  Pap and cervical cultures obtained  Initial prenatal labs with 2-hour GTT  Asked her to begin low-dose aspirin, vitamin D3 and continue her calcium 500 mg.  Risk of uterine rupture from multiple previous  sections were reviewed.  Uterine atony with multiple pregnancies reviewed.  \Reviewed the options for an RRS with the  she is not interested.

## 2025-02-26 NOTE — PROGRESS NOTES
Mahsa Gunn presents to office today for initial prenatal visit also f/u from emergency room for palpitations and hypertension  Patient states that she has had 1 episode of palpitations since emergency  Patient states that she usually doesn't have a problem with hypertension only when thyroid is bad  Patient is 13w0d  Patient seen in [facility] 1/15/2025  Patient denies bleeding, LOF, or contractions.  Denies fetal movement at this time     Pap collected, labeled and sent to lab

## 2025-02-27 LAB
CHLAMYDIA BY THIN PREP: NORMAL
HPV SAMPLE: NORMAL
HPV SOURCE: NORMAL
HPV, GENOTYPE 16: NORMAL
HPV, GENOTYPE 18: NORMAL
HPV, HIGH RISK OTHER: NORMAL
HPV, INTERPRETATION: NORMAL
N. GONORRHOEAE DNA, THIN PREP: NORMAL

## 2025-03-03 LAB
GYNECOLOGY CYTOLOGY REPORT: NORMAL
HPV SAMPLE: NORMAL
HPV SOURCE: NORMAL
HPV, GENOTYPE 16: NOT DETECTED
HPV, GENOTYPE 18: NOT DETECTED
HPV, HIGH RISK OTHER: NOT DETECTED
HPV, INTERPRETATION: NORMAL

## 2025-03-07 LAB
CHLAMYDIA BY THIN PREP: NEGATIVE
N. GONORRHOEAE DNA, THIN PREP: NEGATIVE

## 2025-03-19 ENCOUNTER — HOSPITAL ENCOUNTER (OUTPATIENT)
Age: 35
Discharge: HOME | End: 2025-03-19
Payer: COMMERCIAL

## 2025-03-19 DIAGNOSIS — O09.91 PREGNANCY, SUPERVISION, HIGH-RISK, FIRST TRIMESTER: ICD-10-CM

## 2025-03-19 LAB
ABO + RH BLD: NORMAL
AMOUNT GLUCOSE GIVEN: 75 G
COLLECT TIME, 1HR GLUCOSE: NORMAL
COLLECT TIME, 2HR GLUCOSE: NORMAL
COLLECT TIME, FASTING GLUCOSE: NORMAL
ERYTHROCYTE [DISTWIDTH] IN BLOOD BY AUTOMATED COUNT: 13.1 % (ref 11.5–15)
GLUCOSE 2H P 100 G GLC PO SERPL-MCNC: 87 MG/DL
GLUCOSE 3H P 100 G GLC PO SERPL-MCNC: 161 MG/DL
GLUCOSE TOLERANCE TEST 2 HOUR: 119 MG/DL
HCT VFR BLD AUTO: 38 % (ref 34–48)
HGB BLD-MCNC: 13.1 G/DL (ref 11.5–15.5)
MCH RBC QN AUTO: 31.6 PG (ref 26–35)
MCHC RBC AUTO-ENTMCNC: 34.5 G/DL (ref 32–34.5)
MCV RBC AUTO: 91.6 FL (ref 80–99.9)
PLATELET # BLD AUTO: 257 K/UL (ref 130–450)
PMV BLD AUTO: 9.2 FL (ref 7–12)
RBC # BLD AUTO: 4.15 M/UL (ref 3.5–5.5)
WBC OTHER # BLD: 9.2 K/UL (ref 4.5–11.5)

## 2025-03-19 PROCEDURE — 86900 BLOOD TYPING SEROLOGIC ABO: CPT

## 2025-03-19 PROCEDURE — 86803 HEPATITIS C AB TEST: CPT

## 2025-03-19 PROCEDURE — 87086 URINE CULTURE/COLONY COUNT: CPT

## 2025-03-19 PROCEDURE — 82951 GLUCOSE TOLERANCE TEST (GTT): CPT

## 2025-03-19 PROCEDURE — 85027 COMPLETE CBC AUTOMATED: CPT

## 2025-03-19 PROCEDURE — 36415 COLL VENOUS BLD VENIPUNCTURE: CPT

## 2025-03-19 PROCEDURE — 83020 HEMOGLOBIN ELECTROPHORESIS: CPT

## 2025-03-19 PROCEDURE — 87389 HIV-1 AG W/HIV-1&-2 AB AG IA: CPT

## 2025-03-19 PROCEDURE — 86762 RUBELLA ANTIBODY: CPT

## 2025-03-19 PROCEDURE — 86592 SYPHILIS TEST NON-TREP QUAL: CPT

## 2025-03-19 PROCEDURE — 86901 BLOOD TYPING SEROLOGIC RH(D): CPT

## 2025-03-19 PROCEDURE — 87340 HEPATITIS B SURFACE AG IA: CPT

## 2025-03-20 LAB
HBV SURFACE AG SERPL QL IA: NONREACTIVE
HCV AB SERPL QL IA: NONREACTIVE
HIV 1+2 AB+HIV1 P24 AG SERPL QL IA: NONREACTIVE
MICROORGANISM SPEC CULT: ABNORMAL
RPR SER QL: NONREACTIVE
RUBV IGG SERPL QL IA: NORMAL IU/ML
SPECIMEN DESCRIPTION: ABNORMAL

## 2025-03-21 LAB
HGB ELECTROPHORESIS INTERP: NORMAL
PATHOLOGIST: NORMAL

## 2025-03-24 ENCOUNTER — TELEPHONE (OUTPATIENT)
Dept: OBGYN | Age: 35
End: 2025-03-24

## 2025-03-24 NOTE — TELEPHONE ENCOUNTER
Patient called and left a vm that she wants to know if you are going to order something for her for her urine results.  Called back and left her a vm that you would be in tomorrow.

## 2025-04-09 ENCOUNTER — ROUTINE PRENATAL (OUTPATIENT)
Dept: OBGYN | Age: 35
End: 2025-04-09
Payer: COMMERCIAL

## 2025-04-09 ENCOUNTER — ROUTINE PRENATAL (OUTPATIENT)
Dept: OBGYN CLINIC | Age: 35
End: 2025-04-09
Payer: MEDICAID

## 2025-04-09 ENCOUNTER — ANCILLARY PROCEDURE (OUTPATIENT)
Dept: OBGYN CLINIC | Age: 35
End: 2025-04-09
Payer: MEDICAID

## 2025-04-09 VITALS
HEART RATE: 102 BPM | TEMPERATURE: 98 F | DIASTOLIC BLOOD PRESSURE: 78 MMHG | BODY MASS INDEX: 41.2 KG/M2 | SYSTOLIC BLOOD PRESSURE: 114 MMHG | WEIGHT: 240 LBS | RESPIRATION RATE: 12 BRPM | OXYGEN SATURATION: 100 %

## 2025-04-09 VITALS
BODY MASS INDEX: 41.2 KG/M2 | DIASTOLIC BLOOD PRESSURE: 78 MMHG | HEART RATE: 93 BPM | SYSTOLIC BLOOD PRESSURE: 114 MMHG | WEIGHT: 240 LBS

## 2025-04-09 DIAGNOSIS — R35.0 URINARY FREQUENCY: Primary | ICD-10-CM

## 2025-04-09 DIAGNOSIS — O09.529 ANTEPARTUM MULTIGRAVIDA OF ADVANCED MATERNAL AGE: ICD-10-CM

## 2025-04-09 DIAGNOSIS — O34.219 PREVIOUS CESAREAN SECTION COMPLICATING PREGNANCY, ANTEPARTUM CONDITION OR COMPLICATION: ICD-10-CM

## 2025-04-09 DIAGNOSIS — Z36.3 ENCOUNTER FOR ROUTINE SCREENING FOR FETAL MALFORMATION USING ULTRASOUND: ICD-10-CM

## 2025-04-09 DIAGNOSIS — Z36.3 ENCOUNTER FOR ANTENATAL SCREENING FOR MALFORMATION USING ULTRASOUND: ICD-10-CM

## 2025-04-09 DIAGNOSIS — O09.529 ADVANCED MATERNAL AGE IN MULTIGRAVIDA, UNSPECIFIED TRIMESTER: Primary | ICD-10-CM

## 2025-04-09 DIAGNOSIS — Z03.75 SUSPECTED CERVICAL SHORTENING NOT FOUND: ICD-10-CM

## 2025-04-09 DIAGNOSIS — O16.9 HYPERTENSION AFFECTING PREGNANCY, ANTEPARTUM: ICD-10-CM

## 2025-04-09 DIAGNOSIS — E05.00 GRAVES' DISEASE: ICD-10-CM

## 2025-04-09 LAB
GLUCOSE URINE, POC: NORMAL MG/DL
PROTEIN UA: NEGATIVE

## 2025-04-09 PROCEDURE — 99213 OFFICE O/P EST LOW 20 MIN: CPT | Performed by: OBSTETRICS & GYNECOLOGY

## 2025-04-09 PROCEDURE — 0502F SUBSEQUENT PRENATAL CARE: CPT | Performed by: OBSTETRICS & GYNECOLOGY

## 2025-04-09 PROCEDURE — 81002 URINALYSIS NONAUTO W/O SCOPE: CPT | Performed by: OBSTETRICS & GYNECOLOGY

## 2025-04-09 PROCEDURE — 76811 OB US DETAILED SNGL FETUS: CPT | Performed by: OBSTETRICS & GYNECOLOGY

## 2025-04-09 PROCEDURE — 99999 PR OFFICE/OUTPT VISIT,PROCEDURE ONLY: CPT | Performed by: OBSTETRICS & GYNECOLOGY

## 2025-04-09 PROCEDURE — 76817 TRANSVAGINAL US OBSTETRIC: CPT | Performed by: OBSTETRICS & GYNECOLOGY

## 2025-04-09 NOTE — PROGRESS NOTES
Patient alert and pleasant with complaints of urinary frequency and unable to empty her bladder all the way.   Here today for prenatal visit.  Patient was seen by mfm today  Discharge instructions have been discussed with the patient. Patient advised to call our office with any questions or concerns.   Voiced understanding.   Health tracks (urine) obtained, labeled and sent to lab

## 2025-04-09 NOTE — PROGRESS NOTES
Positive quickening no BLD LOF.  Has FA US scheduled.  Reviewed second trimester changes. IActionable urine

## 2025-04-10 PROBLEM — Z36.3 ENCOUNTER FOR ANTENATAL SCREENING FOR MALFORMATION USING ULTRASOUND: Status: ACTIVE | Noted: 2025-04-10

## 2025-04-10 NOTE — PROGRESS NOTES
2025    Jina Morenita, DO  [address] [address]     RE:  MAHSA VALENTE  : 1990   AGE: 34 y.o.      Dear Dr. Muro:    Mahsa Valente was scheduled for a fetal ultrasound assessment only.  A comprehensive ultrasound report is included under the imaging tab from today's date.    A living patton intrauterine fetus was identified in the cephalic presentation, with normal fetal heart motion and normal fetal motion noted.  The amniotic fluid volume was within normal limits.  The estimated fetal weight was 305 grams.  The placenta was on the posterior surface of the uterus.  A three-vessel umbilical cord was identified, normally implanted into the placenta.  The biometric measurements were consistent with the patient's established dating parameters, within the limits of error the test at the current gestational age.  No apparent gross fetal anatomic abnormalities were identified in the areas which were visualized.    Ultrasound is not diagnostic for fetal aneuploidy or other karyotype abnormalities, and may not detect all structural fetal defects, even if multiple examinations are performed during a  pregnancy.  Normal ultrasound findings did not equate with a normal fetal outcome.    Transvaginal ultrasound assessment of the cervix was performed. The cervical length was 40.6 mm, without funneling of the amniotic membranes.      A repeat ultrasound evaluation for fetal anatomy and growth is recommended in approximately 4 weeks, unless there is a clinical indication to perform the examination prior to that time.    If you have any questions regarding her management, please contact me at your convenience and thank you for allowing me to participate in her care    Sincerely,        Abdulkadir Cummings MD, MS, FACOG, FACS, RDCS, RDMS, RVT  Director Maternal-Fetal Medicine  [facility]  [phone]

## 2025-05-07 ENCOUNTER — ROUTINE PRENATAL (OUTPATIENT)
Dept: OBGYN CLINIC | Age: 35
End: 2025-05-07
Payer: COMMERCIAL

## 2025-05-07 ENCOUNTER — ANCILLARY PROCEDURE (OUTPATIENT)
Dept: OBGYN CLINIC | Age: 35
End: 2025-05-07
Payer: MEDICAID

## 2025-05-07 VITALS
BODY MASS INDEX: 42.55 KG/M2 | HEART RATE: 96 BPM | SYSTOLIC BLOOD PRESSURE: 128 MMHG | HEIGHT: 64 IN | WEIGHT: 249.2 LBS | TEMPERATURE: 97.7 F | OXYGEN SATURATION: 99 % | DIASTOLIC BLOOD PRESSURE: 84 MMHG

## 2025-05-07 DIAGNOSIS — E66.01 MATERNAL MORBID OBESITY, ANTEPARTUM (HCC): ICD-10-CM

## 2025-05-07 DIAGNOSIS — Z36.3 ENCOUNTER FOR ROUTINE SCREENING FOR FETAL MALFORMATION USING ULTRASOUND: ICD-10-CM

## 2025-05-07 DIAGNOSIS — O34.219 PREVIOUS CESAREAN SECTION COMPLICATING PREGNANCY, ANTEPARTUM CONDITION OR COMPLICATION: ICD-10-CM

## 2025-05-07 DIAGNOSIS — Z86.19 HISTORY OF HERPES GENITALIS: ICD-10-CM

## 2025-05-07 DIAGNOSIS — O16.9 HYPERTENSION AFFECTING PREGNANCY, ANTEPARTUM: Primary | ICD-10-CM

## 2025-05-07 DIAGNOSIS — O09.529 ADVANCED MATERNAL AGE IN MULTIGRAVIDA, UNSPECIFIED TRIMESTER: ICD-10-CM

## 2025-05-07 DIAGNOSIS — O99.210 MATERNAL MORBID OBESITY, ANTEPARTUM (HCC): ICD-10-CM

## 2025-05-07 DIAGNOSIS — E05.00 GRAVES' DISEASE: ICD-10-CM

## 2025-05-07 DIAGNOSIS — O09.529 ANTEPARTUM MULTIGRAVIDA OF ADVANCED MATERNAL AGE: ICD-10-CM

## 2025-05-07 LAB
GLUCOSE URINE, POC: NEGATIVE MG/DL
PROTEIN UA: NEGATIVE

## 2025-05-07 PROCEDURE — 81002 URINALYSIS NONAUTO W/O SCOPE: CPT | Performed by: OBSTETRICS & GYNECOLOGY

## 2025-05-07 PROCEDURE — 99213 OFFICE O/P EST LOW 20 MIN: CPT | Performed by: OBSTETRICS & GYNECOLOGY

## 2025-05-07 PROCEDURE — 76816 OB US FOLLOW-UP PER FETUS: CPT | Performed by: OBSTETRICS & GYNECOLOGY

## 2025-05-07 RX ORDER — VALACYCLOVIR HYDROCHLORIDE 500 MG/1
500 TABLET, FILM COATED ORAL 2 TIMES DAILY
Qty: 60 TABLET | Refills: 3 | Status: SHIPPED | OUTPATIENT
Start: 2025-05-07

## 2025-05-07 NOTE — PATIENT INSTRUCTIONS
Please arrive for your scheduled appointment at least 15 minutes early with your actual insurance card+ a photo ID. Also if you need any refills ordered or have questions, it may take up 48 hours to reply. Please allow ample time for your refills. Call me when you use last refill. Thank you for your cooperation. You might be having an NST at your next appt. Please eat a large snack or breakfast before coming to office. Thank youCall your primary obstetrician with bleeding, leaking of fluid, abdominal tenderness, headache, blurry vision, epigastric pain and increased urinary frequency.If you are experiencing an emergency and need immediate help, call 911 or go to go emergency room or labor and delivery. Do kick counts after dinner. Call your primary obstetrician if less than 10 kicks in 2 hours after dinner.     Call your primary obstetrician with bleeding, leaking of fluid, abdominal tenderness, headache, blurry vision, epigastric pain and increased urinary frequency.if you are sick, not feeling well or have an infectious process going on please reschedule your appointment by calling [phone]. Also if any family members are not feeling well, please do not bring them to your appointment. We appreciate your cooperation. We are doing this in order to protect our pregnant mothers+ their babies.if you are sick, not feeling well or have an infectious process going on please reschedule your appointment by calling [phone]. Also if any family members are not feeling well, please do not bring them to your appointment. We appreciate your cooperation. We are doing this in order to protect our pregnant mothers+ their babies.

## 2025-05-07 NOTE — PROGRESS NOTES
Morenita Muro, DO  [facility]  [address] [address]     RE:  VIANCA VALENTE  : 1990   AGE: 34 y.o.    This report has been created using voice recognition software. It may contain errors which are inherent in voice recognition technology.    Dear Dr. Muro:     Vianca Valente had an appointment today for the following indications:     Patient Active Problem List   Diagnosis    Graves' disease    Previous  section complicating pregnancy, antepartum condition or complication    Antepartum multigravida of advanced maternal age    Encounter for  screening for malformation using ultrasound    History of herpes genitalis    Maternal morbid obesity, antepartum (HCC)     Vianca Valente is a 34 y.o. female, who is G5(3,0,1,3). She has an Estimated Date of Delivery: 9/3/2025 based on her established dates.  She is currently 23 weeks 0 days gestation based on that assessment.     The patient is of advanced maternal age. I advised the patient of her age related risk of having a fetus with Down syndrome is 1:137. This is based on the [facility] Genetic Disease Branch Data. Her background risk of having a fetus with any congenital abnormality is 3% to 5%. Her background risk of pregnancy loss is 1% to 2%.    I discussed the [id number] screen with the patient. I advised her that this a screening test not a diagnostic test. I advised her that the test screens only for trisomy 21, 18 and 13. We discussed the sensitivity of the test which I advised the patient is as follows:      99.1% for detection of trisomy 21 with a specificity of 99.9%   >99.9% for trisomy 18 with a specificity of 99.6%     91.7% for trisomy 13 with a specificity of 99.7%     The patient understands that she may be a candidate for diagnostic genetic testing regardless of the results of her screen. Genetics. She understands that the [id number] testing

## 2025-05-07 NOTE — PROGRESS NOTES
Mahsa Gunn here for follow up ultrasound today.   She denies bleeding, lof, or cramping.   Positive fetal movement.

## 2025-05-14 ENCOUNTER — TELEPHONE (OUTPATIENT)
Age: 35
End: 2025-05-14

## 2025-05-14 NOTE — TELEPHONE ENCOUNTER
Patient cancelled 5/15/25 prenatal visit. Pleases advise for rescheduling recommendations. Next slot on sched not until 6/19/25. [phone].

## 2025-06-04 ENCOUNTER — TELEPHONE (OUTPATIENT)
Age: 35
End: 2025-06-04

## 2025-06-11 ENCOUNTER — ROUTINE PRENATAL (OUTPATIENT)
Age: 35
End: 2025-06-11

## 2025-06-11 VITALS
TEMPERATURE: 97.7 F | SYSTOLIC BLOOD PRESSURE: 121 MMHG | DIASTOLIC BLOOD PRESSURE: 78 MMHG | BODY MASS INDEX: 44.11 KG/M2 | HEART RATE: 91 BPM | WEIGHT: 257 LBS

## 2025-06-11 DIAGNOSIS — O34.219 PREVIOUS CESAREAN SECTION COMPLICATING PREGNANCY, ANTEPARTUM CONDITION OR COMPLICATION: ICD-10-CM

## 2025-06-11 DIAGNOSIS — O09.529 ANTEPARTUM MULTIGRAVIDA OF ADVANCED MATERNAL AGE: Primary | ICD-10-CM

## 2025-06-11 DIAGNOSIS — E05.00 GRAVES' DISEASE: ICD-10-CM

## 2025-06-11 DIAGNOSIS — O16.9 HYPERTENSION AFFECTING PREGNANCY, ANTEPARTUM: ICD-10-CM

## 2025-06-11 DIAGNOSIS — O99.210 MATERNAL MORBID OBESITY, ANTEPARTUM (HCC): ICD-10-CM

## 2025-06-11 DIAGNOSIS — E66.01 MATERNAL MORBID OBESITY, ANTEPARTUM (HCC): ICD-10-CM

## 2025-06-11 DIAGNOSIS — O09.529 ADVANCED MATERNAL AGE IN MULTIGRAVIDA, UNSPECIFIED TRIMESTER: ICD-10-CM

## 2025-06-11 DIAGNOSIS — Z36.3 ENCOUNTER FOR ANTENATAL SCREENING FOR MALFORMATION USING ULTRASOUND: ICD-10-CM

## 2025-06-11 LAB
GLUCOSE URINE, POC: NORMAL MG/DL
PROTEIN UA: NEGATIVE

## 2025-06-12 NOTE — PROGRESS NOTES
Patient is here today for 4 wk f/u. Denies any vaginal bleeding,  or leakage of fluids.  Mild cramping.  Patient reports good fetal movement.     
understands that the [id number] testing does not replace diagnostic genetic testing.      Limitations of the test as stated by [employer]:  While the results of these tests are highly reliable, discordant results, including an accurate fetal sex prediction, may occur due to placental, maternal or fetal mosaicism or neoplasm, vanishing twin; and or maternal organ transplant, or other causes.  These tests are screening tests and not diagnostic.  They do not replace the accuracy and precision of prenatal diagnosis with CVS or amniocentesis.  A patient with a positive test result should be referred for genetic counseling and offered invasive prenatal diagnosis for confirmation of test results.  Pregnancy management decisions, including termination of the pregnancy, should not be based on the results of these tests alone.  Sex chromosome aneuploidy's are not reportable for known multiple gestations.  A negative result does not ensure an unaffected pregnancy nor does not exclude the possibility of other chromosomal abnormalities or birth defects which are not a part of these tests.  An uninformative result may be reported.  The causes of which may include, but are not limited to, insufficient sequencing coverage, noise or artifacts in the region, amplification or sequencing bias, or insufficient fetal fraction.      These tests are not intended to identify pregnancies at risk for neural tube defects or ventral wall defects.  Testing for whole chromosome abnormalities, including sex chromosomes, and or some chromosomal abnormalities, could lead to the potential discovery of both fetal and maternal genomic abnormalities that could have major, minor, or no clinical significance.  Evaluating the significance of a positive or a non-recordable result may involve both invasive testing and additional studies on the mother.  Such investigations may lead to a diagnosis of maternal chromosome or some chromosomal

## 2025-07-07 ENCOUNTER — ROUTINE PRENATAL (OUTPATIENT)
Age: 35
End: 2025-07-07

## 2025-07-07 VITALS
SYSTOLIC BLOOD PRESSURE: 108 MMHG | HEIGHT: 64 IN | OXYGEN SATURATION: 97 % | WEIGHT: 264 LBS | HEART RATE: 105 BPM | RESPIRATION RATE: 16 BRPM | DIASTOLIC BLOOD PRESSURE: 83 MMHG | BODY MASS INDEX: 45.07 KG/M2

## 2025-07-07 DIAGNOSIS — O34.219 PREVIOUS CESAREAN SECTION COMPLICATING PREGNANCY, ANTEPARTUM CONDITION OR COMPLICATION: ICD-10-CM

## 2025-07-07 DIAGNOSIS — Z36.3 ENCOUNTER FOR ANTENATAL SCREENING FOR MALFORMATION USING ULTRASOUND: ICD-10-CM

## 2025-07-07 DIAGNOSIS — O99.210 MATERNAL MORBID OBESITY, ANTEPARTUM (HCC): ICD-10-CM

## 2025-07-07 DIAGNOSIS — O09.529 ANTEPARTUM MULTIGRAVIDA OF ADVANCED MATERNAL AGE: ICD-10-CM

## 2025-07-07 DIAGNOSIS — E66.01 MATERNAL MORBID OBESITY, ANTEPARTUM (HCC): ICD-10-CM

## 2025-07-07 DIAGNOSIS — O09.529 ADVANCED MATERNAL AGE IN MULTIGRAVIDA, UNSPECIFIED TRIMESTER: ICD-10-CM

## 2025-07-07 DIAGNOSIS — O16.9 HYPERTENSION AFFECTING PREGNANCY, ANTEPARTUM: Primary | ICD-10-CM

## 2025-07-07 DIAGNOSIS — E05.00 GRAVES' DISEASE: ICD-10-CM

## 2025-07-07 DIAGNOSIS — Z3A.31 31 WEEKS GESTATION OF PREGNANCY: ICD-10-CM

## 2025-07-07 LAB
GLUCOSE URINE, POC: NEGATIVE MG/DL
PROTEIN UA: NEGATIVE

## 2025-07-07 NOTE — PROGRESS NOTES
Pt here for scheduled MFM appointment. Pt is 31w5d. Pt has no concerns at this time. Pt perceives normal fetal movement.

## 2025-07-10 ENCOUNTER — ROUTINE PRENATAL (OUTPATIENT)
Age: 35
End: 2025-07-10

## 2025-07-10 VITALS
BODY MASS INDEX: 45.27 KG/M2 | DIASTOLIC BLOOD PRESSURE: 72 MMHG | TEMPERATURE: 98.1 F | OXYGEN SATURATION: 97 % | HEIGHT: 64 IN | HEART RATE: 107 BPM | WEIGHT: 265.2 LBS | SYSTOLIC BLOOD PRESSURE: 125 MMHG

## 2025-07-10 DIAGNOSIS — Z3A.32 32 WEEKS GESTATION OF PREGNANCY: Primary | ICD-10-CM

## 2025-07-10 DIAGNOSIS — O09.93 ENCOUNTER FOR SUPERVISION OF HIGH RISK PREGNANCY IN THIRD TRIMESTER, ANTEPARTUM: ICD-10-CM

## 2025-07-10 LAB
GLUCOSE URINE, POC: NORMAL MG/DL
PROTEIN UA: NEGATIVE

## 2025-07-10 NOTE — PROGRESS NOTES
Pt presents for routine prenatal appointment.   Pt denies bleeding, cramping, or leaking of fluids.   Patient has a rash on bottom of her stomach that she would like looked at.   Pt is feeling appropriate fetal movement.   Urine is negative for glucose and protein.  Pharmacy and medications reviewed and verified with pt.    No other concerns at this time.

## 2025-07-10 NOTE — PROGRESS NOTES
Limited prenatal care thus far good fm, no bld, LOF or ctns declined Tdap.  26/28-week labs ordered.  No RR as desired.  We did review risks of many  sections including uterine atony, infection, adhesions, injury to other organs including bladder blood vessels and bowel.  Length of stay.  Spinal anesthesia.  Placenta is posterior  is scheduled for 2025 at noon.

## 2025-07-12 NOTE — PROGRESS NOTES
Morenita Muro, DO  [facility]  [address] [address]     RE:  VIANCA VALENTE  : 1990   AGE: 35 y.o.    This report has been created using voice recognition software. It may contain errors which are inherent in voice recognition technology.    Dear Dr. Muro:     Vianca Valente had fetal testing performed today for the following indications:     Patient Active Problem List   Diagnosis    Graves' disease    Previous  section complicating pregnancy, antepartum condition or complication    Antepartum multigravida of advanced maternal age    Encounter for  screening for malformation using ultrasound    History of herpes genitalis    Maternal morbid obesity, antepartum (HCC)     Vianca Valente is a 35 y.o. female, who is G5(3,0,1,3). She has an Estimated Date of Delivery: 9/3/2025 based on her established dates.  She is currently 31 weeks 5 days gestation based on that assessment.     The patient is of advanced maternal age. I advised the patient of her age related risk of having a fetus with Down syndrome is 1:137. This is based on the [facility] Genetic Disease Branch Data. Her background risk of having a fetus with any congenital abnormality is 3% to 5%. Her background risk of pregnancy loss is 1% to 2%.    I previously discussed the [id number] screen with the patient. I advised her that this a screening test not a diagnostic test. I advised her that the test screens only for trisomy 21, 18 and 13. We discussed the sensitivity of the test which I advised the patient is as follows:      99.1% for detection of trisomy 21 with a specificity of 99.9%   >99.9% for trisomy 18 with a specificity of 99.6%     91.7% for trisomy 13 with a specificity of 99.7%     The patient understands that she may be a candidate for diagnostic genetic testing regardless of the results of her screen. Genetics. She understands that the

## 2025-07-14 ENCOUNTER — ROUTINE PRENATAL (OUTPATIENT)
Age: 35
End: 2025-07-14

## 2025-07-14 VITALS
DIASTOLIC BLOOD PRESSURE: 85 MMHG | HEART RATE: 101 BPM | SYSTOLIC BLOOD PRESSURE: 117 MMHG | BODY MASS INDEX: 45.9 KG/M2 | OXYGEN SATURATION: 99 % | TEMPERATURE: 97.7 F | RESPIRATION RATE: 16 BRPM | WEIGHT: 267.4 LBS

## 2025-07-14 DIAGNOSIS — O16.9 HYPERTENSION AFFECTING PREGNANCY, ANTEPARTUM: Primary | ICD-10-CM

## 2025-07-14 DIAGNOSIS — E05.00 GRAVES' DISEASE: ICD-10-CM

## 2025-07-14 DIAGNOSIS — E66.01 MATERNAL MORBID OBESITY, ANTEPARTUM (HCC): ICD-10-CM

## 2025-07-14 DIAGNOSIS — O34.219 PREVIOUS CESAREAN SECTION COMPLICATING PREGNANCY, ANTEPARTUM CONDITION OR COMPLICATION: ICD-10-CM

## 2025-07-14 DIAGNOSIS — O09.529 ANTEPARTUM MULTIGRAVIDA OF ADVANCED MATERNAL AGE: ICD-10-CM

## 2025-07-14 DIAGNOSIS — O99.210 MATERNAL MORBID OBESITY, ANTEPARTUM (HCC): ICD-10-CM

## 2025-07-14 DIAGNOSIS — O09.529 ADVANCED MATERNAL AGE IN MULTIGRAVIDA, UNSPECIFIED TRIMESTER: ICD-10-CM

## 2025-07-14 DIAGNOSIS — Z86.19 HISTORY OF HERPES GENITALIS: ICD-10-CM

## 2025-07-14 DIAGNOSIS — Z3A.32 32 WEEKS GESTATION OF PREGNANCY: ICD-10-CM

## 2025-07-14 LAB
GLUCOSE URINE, POC: NEGATIVE MG/DL
PROTEIN UA: NEGATIVE

## 2025-07-14 NOTE — PROGRESS NOTES
Pt here for scheduled MFM appointment. Pt is a  and is currently 32w5d. Pt perceives normal fetal movement. Pt denies other concerns at this time.

## 2025-07-18 ENCOUNTER — HOSPITAL ENCOUNTER (OUTPATIENT)
Age: 35
Discharge: HOME | End: 2025-07-18
Payer: COMMERCIAL

## 2025-07-18 ENCOUNTER — RESULTS FOLLOW-UP (OUTPATIENT)
Dept: OBGYN | Age: 35
End: 2025-07-18

## 2025-07-18 DIAGNOSIS — O09.93 ENCOUNTER FOR SUPERVISION OF HIGH RISK PREGNANCY IN THIRD TRIMESTER, ANTEPARTUM: ICD-10-CM

## 2025-07-18 DIAGNOSIS — O99.810 ABNORMAL GLUCOSE TOLERANCE TEST (GTT) DURING PREGNANCY, ANTEPARTUM: Primary | ICD-10-CM

## 2025-07-18 LAB
AMOUNT GLUCOSE GIVEN: 50 G
COLLECT TIME, 1HR GLUCOSE: NORMAL
ERYTHROCYTE [DISTWIDTH] IN BLOOD BY AUTOMATED COUNT: 13.3 % (ref 11.5–15)
FERRITIN SERPL-MCNC: 32 NG/ML
GLUCOSE 3H P 100 G GLC PO SERPL-MCNC: 151 MG/DL
HCT VFR BLD AUTO: 35.6 % (ref 34–48)
HGB BLD-MCNC: 12.3 G/DL (ref 11.5–15.5)
MCH RBC QN AUTO: 32.4 PG (ref 26–35)
MCHC RBC AUTO-ENTMCNC: 34.6 G/DL (ref 32–34.5)
MCV RBC AUTO: 93.7 FL (ref 80–99.9)
PLATELET # BLD AUTO: 219 K/UL (ref 130–450)
PMV BLD AUTO: 9.3 FL (ref 7–12)
RBC # BLD AUTO: 3.8 M/UL (ref 3.5–5.5)
WBC OTHER # BLD: 12.2 K/UL (ref 4.5–11.5)

## 2025-07-18 PROCEDURE — 36415 COLL VENOUS BLD VENIPUNCTURE: CPT

## 2025-07-18 PROCEDURE — 85027 COMPLETE CBC AUTOMATED: CPT

## 2025-07-18 PROCEDURE — 82950 GLUCOSE TEST: CPT

## 2025-07-18 PROCEDURE — 82728 ASSAY OF FERRITIN: CPT

## 2025-07-18 PROCEDURE — 86592 SYPHILIS TEST NON-TREP QUAL: CPT

## 2025-07-18 NOTE — TELEPHONE ENCOUNTER
Called patient, left message for her to call office to discuss glucose results. Also sent detailed message via active my chart to notify, \"Please let her know that her 1 hour glucose tolerance test is abnormal she needs to do a 3-hour glucose tolerance test I have placed the order for that it is fasting 8 hours. \"

## 2025-07-18 NOTE — TELEPHONE ENCOUNTER
----- Message from Dr. Morenita Muro, DO sent at 7/18/2025 12:48 PM EDT -----  Please let her know that her 1 hour glucose tolerance test is abnormal she needs to do a 3-hour glucose tolerance test I have placed the order for that it is fasting 8 hours.

## 2025-07-21 LAB — RPR SER QL: NONREACTIVE

## 2025-07-22 ENCOUNTER — ROUTINE PRENATAL (OUTPATIENT)
Age: 35
End: 2025-07-22
Payer: COMMERCIAL

## 2025-07-22 ENCOUNTER — ANCILLARY PROCEDURE (OUTPATIENT)
Age: 35
End: 2025-07-22
Payer: COMMERCIAL

## 2025-07-22 ENCOUNTER — HOSPITAL ENCOUNTER (OUTPATIENT)
Age: 35
Discharge: HOME | End: 2025-07-22
Payer: COMMERCIAL

## 2025-07-22 VITALS
HEART RATE: 90 BPM | WEIGHT: 268 LBS | TEMPERATURE: 97.5 F | BODY MASS INDEX: 46 KG/M2 | SYSTOLIC BLOOD PRESSURE: 116 MMHG | DIASTOLIC BLOOD PRESSURE: 70 MMHG

## 2025-07-22 DIAGNOSIS — E05.00 GRAVES' DISEASE: ICD-10-CM

## 2025-07-22 DIAGNOSIS — O09.529 ADVANCED MATERNAL AGE IN MULTIGRAVIDA, UNSPECIFIED TRIMESTER: ICD-10-CM

## 2025-07-22 DIAGNOSIS — O09.529 ANTEPARTUM MULTIGRAVIDA OF ADVANCED MATERNAL AGE: ICD-10-CM

## 2025-07-22 DIAGNOSIS — O99.210 MATERNAL MORBID OBESITY, ANTEPARTUM (HCC): ICD-10-CM

## 2025-07-22 DIAGNOSIS — O99.810 ABNORMAL GLUCOSE TOLERANCE TEST (GTT) DURING PREGNANCY, ANTEPARTUM: ICD-10-CM

## 2025-07-22 DIAGNOSIS — O34.219 PREVIOUS CESAREAN SECTION COMPLICATING PREGNANCY, ANTEPARTUM CONDITION OR COMPLICATION: ICD-10-CM

## 2025-07-22 DIAGNOSIS — E66.01 MATERNAL MORBID OBESITY, ANTEPARTUM (HCC): ICD-10-CM

## 2025-07-22 DIAGNOSIS — O16.9 HYPERTENSION AFFECTING PREGNANCY, ANTEPARTUM: Primary | ICD-10-CM

## 2025-07-22 LAB
AMOUNT GLUCOSE GIVEN: 100 G
COLLECT TIME, 1HR GLUCOSE: NORMAL
COLLECT TIME, 2HR GLUCOSE: NORMAL
COLLECT TIME, 3HR GLUCOSE: NORMAL
COLLECT TIME, FASTING GLUCOSE: NORMAL
GLUCOSE 2H P 100 G GLC PO SERPL-MCNC: 97 MG/DL (ref 74–99)
GLUCOSE 3H P 100 G GLC PO SERPL-MCNC: 183 MG/DL
GLUCOSE TOLERANCE TEST 2 HOUR: 147 MG/DL
GLUCOSE TOLERANCE TEST 3 HOUR: 133 MG/DL
GLUCOSE URINE, POC: NORMAL MG/DL
PROTEIN UA: NEGATIVE

## 2025-07-22 PROCEDURE — 82952 GTT-ADDED SAMPLES: CPT

## 2025-07-22 PROCEDURE — 81002 URINALYSIS NONAUTO W/O SCOPE: CPT | Performed by: OBSTETRICS & GYNECOLOGY

## 2025-07-22 PROCEDURE — 99999 PR OFFICE/OUTPT VISIT,PROCEDURE ONLY: CPT | Performed by: OBSTETRICS & GYNECOLOGY

## 2025-07-22 PROCEDURE — 82951 GLUCOSE TOLERANCE TEST (GTT): CPT

## 2025-07-22 PROCEDURE — 36415 COLL VENOUS BLD VENIPUNCTURE: CPT

## 2025-07-23 ENCOUNTER — ROUTINE PRENATAL (OUTPATIENT)
Age: 35
End: 2025-07-23

## 2025-07-23 VITALS
SYSTOLIC BLOOD PRESSURE: 118 MMHG | DIASTOLIC BLOOD PRESSURE: 76 MMHG | BODY MASS INDEX: 46 KG/M2 | WEIGHT: 268 LBS | TEMPERATURE: 97.9 F | HEART RATE: 93 BPM | OXYGEN SATURATION: 98 %

## 2025-07-23 DIAGNOSIS — Z3A.34 34 WEEKS GESTATION OF PREGNANCY: Primary | ICD-10-CM

## 2025-07-23 LAB
GLUCOSE URINE, POC: NEGATIVE MG/DL
PROTEIN UA: NEGATIVE

## 2025-07-23 NOTE — PROGRESS NOTES
Good FM.  No BLD LOF occasional CTN's declined Tdap.  No RR S with .  date is scheduled will discuss the entire process next visit.  Signs and symptoms of  labor preeclampsia and fetal movement count

## 2025-07-26 ENCOUNTER — HOSPITAL ENCOUNTER (OUTPATIENT)
Age: 35
Setting detail: OBSERVATION
Discharge: HOME | End: 2025-07-26
Attending: OBSTETRICS & GYNECOLOGY | Admitting: OBSTETRICS & GYNECOLOGY
Payer: COMMERCIAL

## 2025-07-26 VITALS
HEART RATE: 97 BPM | WEIGHT: 267 LBS | BODY MASS INDEX: 45.58 KG/M2 | DIASTOLIC BLOOD PRESSURE: 60 MMHG | SYSTOLIC BLOOD PRESSURE: 133 MMHG | HEIGHT: 64 IN

## 2025-07-26 PROBLEM — Z3A.34 34 WEEKS GESTATION OF PREGNANCY: Status: ACTIVE | Noted: 2025-07-26

## 2025-07-26 LAB
BACTERIA URNS QL MICRO: ABNORMAL
BILIRUB UR QL STRIP: NEGATIVE
CLARITY UR: CLEAR
COLOR UR: YELLOW
EPI CELLS #/AREA URNS HPF: ABNORMAL /HPF
GLUCOSE UR STRIP-MCNC: NEGATIVE MG/DL
HGB UR QL STRIP.AUTO: NEGATIVE
KETONES UR STRIP-MCNC: 15 MG/DL
LEUKOCYTE ESTERASE UR QL STRIP: NEGATIVE
NITRITE UR QL STRIP: NEGATIVE
PH UR STRIP: 6 [PH] (ref 5–8)
PROT UR STRIP-MCNC: NEGATIVE MG/DL
RBC #/AREA URNS HPF: ABNORMAL /HPF
SP GR UR STRIP: 1.02 (ref 1–1.03)
UROBILINOGEN UR STRIP-ACNC: 0.2 EU/DL (ref 0–1)
WBC #/AREA URNS HPF: ABNORMAL /HPF

## 2025-07-26 PROCEDURE — G0378 HOSPITAL OBSERVATION PER HR: HCPCS

## 2025-07-26 PROCEDURE — 81001 URINALYSIS AUTO W/SCOPE: CPT

## 2025-07-26 PROCEDURE — 99222 1ST HOSP IP/OBS MODERATE 55: CPT

## 2025-07-26 ASSESSMENT — ENCOUNTER SYMPTOMS
VOMITING: 0
CONSTIPATION: 0
DIARRHEA: 0
SHORTNESS OF BREATH: 0
COLOR CHANGE: 0
RESPIRATORY NEGATIVE: 1
NAUSEA: 0
BACK PAIN: 1
ABDOMINAL PAIN: 1
WHEEZING: 0

## 2025-07-26 NOTE — PROGRESS NOTES
Discharge instructions given to PT. PT verbalizes understanding. NO questions at this time. PT ambulated off unit with loved ones.

## 2025-07-26 NOTE — PROGRESS NOTES
at 34w3d presents to antepartum unit with complaints of ctxs q5 minutes in her back that radiates to her abdomen  Hx Graves disease  Denies vb, lof, ha, or blurred vision  States +FM  Bps cycling

## 2025-07-26 NOTE — H&P
[facility]   Labor and Delivery   OB History and Physical        Patient Name: Mahsa Gunn  Patient : 1990  MRN: [id number]   Room/Bed: 0326/0326-A    Primary Provider: Dr. Tian, [facility]    SUBJECTIVE:    CHIEF COMPLAINT:    Chief Complaint   Patient presents with    Contractions       HISTORY OF PRESENT ILLNESS:      Mahsa Riley a 35 y.o.  female at 34w3d presents with Pain coming and going in her low back that radiates up into her abdomen. Pain started about 24 hours ago and it is becoming more intense and more frequent. She more recently is feeling abdominal tightening with the pain.She is having urinary frequency and urgency with no burning. She does not have any vaginal discharge, itching, burning, odor. She had increased frequency of BM today - no diarrhea.     Fetal Movement: Normal  Contractions: yes  Leaking Fluid: No  Vaginal bleeding: No    REVIEW OF SYSTEMS:  Review of Systems   Constitutional: Negative.  Negative for chills and fever.   Respiratory: Negative.  Negative for shortness of breath and wheezing.    Cardiovascular: Negative.  Negative for chest pain and palpitations.   Gastrointestinal:  Positive for abdominal pain. Negative for constipation, diarrhea, nausea and vomiting.   Genitourinary:  Positive for frequency and urgency. Negative for dysuria, flank pain, vaginal bleeding and vaginal discharge.   Musculoskeletal:  Positive for back pain.   Skin: Negative.  Negative for color change and rash.   Neurological: Negative.  Negative for dizziness, weakness and light-headedness.   Psychiatric/Behavioral: Negative.          OBJECTIVE:     Estimated Due Date:   Estimated Date of Delivery: 9/3/25   No LMP recorded. Patient is pregnant.   Dating by 7w US     PREGNANCY RISK FACTORS:       Patient Active Problem List   Diagnosis    Graves' disease    Previous  section complicating pregnancy, antepartum condition or complication    Antepartum multigravida of advanced  C NR, HIV NR, Varicella immune, Rubella immune, RPR NR, GTT 97/183/147/133, GBS Not Tested     Hemoglobin:   Hemoglobin   Date Value Ref Range Status   2025 12.3 11.5 - 15.5 g/dL Final     Hematocrit:   Hematocrit   Date Value Ref Range Status   2025 35.6 34.0 - 48.0 % Final     Platelet Count:   Platelets   Date Value Ref Range Status   2025 219 130 - 450 k/uL Final       ASSESSMENT/PLAN:  Mahsa Gunn is a 35 y.o. female   @ 34w3d with low back pain radiating to the abdomen that comes and goes every 5 min    Reviewed with Dr. Oakley    PLAN:     Admit for observation    -R/O PTL       - FFN not indicated >34w       -Cervix cl/lg/hi        -Recheck 1-2H    R/O UTI       -UA       -neg CVAT    Variable Decel  -Reviewed with Dr. Oakley  -Continuous EFM      Talya Warren, DEMETRA - KENYETTAM

## 2025-07-28 ENCOUNTER — ROUTINE PRENATAL (OUTPATIENT)
Age: 35
End: 2025-07-28
Payer: COMMERCIAL

## 2025-07-28 VITALS
HEART RATE: 104 BPM | TEMPERATURE: 98.1 F | DIASTOLIC BLOOD PRESSURE: 72 MMHG | WEIGHT: 269 LBS | SYSTOLIC BLOOD PRESSURE: 110 MMHG | BODY MASS INDEX: 46.17 KG/M2

## 2025-07-28 DIAGNOSIS — O99.210 MATERNAL MORBID OBESITY, ANTEPARTUM (HCC): ICD-10-CM

## 2025-07-28 DIAGNOSIS — O09.529 ANTEPARTUM MULTIGRAVIDA OF ADVANCED MATERNAL AGE: ICD-10-CM

## 2025-07-28 DIAGNOSIS — E66.01 MATERNAL MORBID OBESITY, ANTEPARTUM (HCC): ICD-10-CM

## 2025-07-28 DIAGNOSIS — E05.00 GRAVES' DISEASE: ICD-10-CM

## 2025-07-28 DIAGNOSIS — O34.219 PREVIOUS CESAREAN SECTION COMPLICATING PREGNANCY, ANTEPARTUM CONDITION OR COMPLICATION: ICD-10-CM

## 2025-07-28 DIAGNOSIS — Z36.3 ENCOUNTER FOR ANTENATAL SCREENING FOR MALFORMATION USING ULTRASOUND: ICD-10-CM

## 2025-07-28 DIAGNOSIS — O16.9 HYPERTENSION AFFECTING PREGNANCY, ANTEPARTUM: Primary | ICD-10-CM

## 2025-07-28 DIAGNOSIS — O09.529 ADVANCED MATERNAL AGE IN MULTIGRAVIDA, UNSPECIFIED TRIMESTER: ICD-10-CM

## 2025-07-28 LAB
GLUCOSE URINE, POC: NORMAL MG/DL
PROTEIN UA: NEGATIVE

## 2025-07-28 PROCEDURE — 81002 URINALYSIS NONAUTO W/O SCOPE: CPT | Performed by: OBSTETRICS & GYNECOLOGY

## 2025-07-28 PROCEDURE — 99999 PR OFFICE/OUTPT VISIT,PROCEDURE ONLY: CPT | Performed by: OBSTETRICS & GYNECOLOGY

## 2025-07-28 NOTE — PROGRESS NOTES
Patient is here today for bpp/nst. Denies any vaginal bleeding, cramping, or leakage of fluids.  Patient reports good fetal movement.     
performing the patient's thyroid function studies on a monthly basis to assess whether or not she requires medical management for this problem.    I recommended that the patient be followed in our office for assessment secondary to the increased risks associated with Graves' disease in pregnancy.    The patient was advised to check her blood pressures 3 times a day, and also if she has any symptoms of hypertension. She is to call if her blood pressure exceeds 140/90, or if she has any new clinical symptomatology, including but not limited to, headache, change in vision, shortness of breath, chest pain, abdominal pain or any new clinically significant symptomatology.     The patient was advised to call if she has any increased vaginal discharge, vaginal bleeding, contractions, abdominal pain, back pain or any new significant symptomatology prior to her next visit. I advised her that these are signs and symptoms of cervical change and require follow-up assessment when they occur.    I requested the patient return for a follow-up assessment in approximately 1 week unless there is a clinical reason for her to return prior to that time. She is to call if she has any problems or questions prior to her next visit.     Further evaluation and management will be dependent on her clinical presentation and the results of her testing.     The patient is to continue to follow with you in your office for ongoing obstetric care.    If you have any questions regarding her management, please contact me at your convenience and thank you for allowing me to participate in her care.    Sincerely,        Abdulkadir Cummings MD, MS, FACOG, FACS, RDCS, RDMS, RVT  Director Maternal-Fetal Medicine  [facility]  [phone]

## 2025-07-29 NOTE — PROGRESS NOTES
Patient is here today for bpp/nst. Denies any vaginal bleeding, or leakage of fluids.  Cramping on and off.  Patient reports good fetal movement.     
Weight Sex Type Anes PTL Lv   5 Current            4 Term 11/15/23 38w5d  3.941 kg F CS-LTranv Spinal N JUAN JOSE   3 Term 19 37w0d  4.961 kg M CS-LTranv Spinal  JUAN JOSE      Birth Comments: [facility]   2 Term 11 39w1d  4.621 kg F CS-Unspec Spinal  JUAN JOSE   1 Ectopic 08    U OTHER        PAST GYNECOLOGICAL  HISTORY:  Negative for abnormal pap smears.   Negative for sexually transmitted diseases.   Negative for cervical LEEP / conization /cryosurgery.    Positive for uterine surgery. Three previous C-sections  Positive for ovarian or tubal surgery. Ruptured right tubal pregnancy.    Past Medical History:   Diagnosis Date    Herpes simplex virus (HSV) infection     History of pregnancy induced hypertension     hx ofGestational diabetes     Hypertension     Hyperthyroidism     Overactive bladder        Past Surgical History:   Procedure Laterality Date     SECTION       SECTION N/A 11/15/2023     SECTION performed by Morenita Muro DO at [facility] L[facility]D OR     SECTION, LOW TRANSVERSE      DILATION AND CURETTAGE      LAPAROSCOPY      ectopic       Allergies   Allergen Reactions    Bee Venom Swelling     Severe localized swelling.  Severe localized swelling.      Gluten     Hornet Venom Swelling     Severe localized swelling.       Current Outpatient Medications:     valACYclovir (VALTREX) 500 MG tablet, Take 1 tablet by mouth 2 times daily, Disp: 60 tablet, Rfl: 3    Potassium Gluconate  MG TBCR, Take 595 mg by mouth 3 times daily, Disp: , Rfl:     ferrous sulfate (FE TABS) 325 (65 Fe) MG EC tablet, Take 1 tablet by mouth daily (with breakfast), Disp: 60 tablet, Rfl: 1    Prenatal MV-Min-Fe Fum-FA-DHA (PRENATAL 1 PO), Take by mouth, Disp: , Rfl:     propranolol (INDERAL LA) 60 MG extended release capsule, Take 1 capsule by mouth daily (Patient not taking: Reported on 2025), Disp: , Rfl:     Social History     Tobacco Use    Smoking status: Former     Current packs/day: 0.00

## 2025-08-04 ENCOUNTER — ROUTINE PRENATAL (OUTPATIENT)
Age: 35
End: 2025-08-04

## 2025-08-04 VITALS
HEIGHT: 64 IN | OXYGEN SATURATION: 98 % | BODY MASS INDEX: 46.51 KG/M2 | DIASTOLIC BLOOD PRESSURE: 78 MMHG | HEART RATE: 93 BPM | WEIGHT: 272.4 LBS | TEMPERATURE: 97.7 F | SYSTOLIC BLOOD PRESSURE: 119 MMHG | RESPIRATION RATE: 16 BRPM

## 2025-08-04 DIAGNOSIS — O34.219 PREVIOUS CESAREAN SECTION COMPLICATING PREGNANCY, ANTEPARTUM CONDITION OR COMPLICATION: ICD-10-CM

## 2025-08-04 DIAGNOSIS — Z3A.35 35 WEEKS GESTATION OF PREGNANCY: ICD-10-CM

## 2025-08-04 DIAGNOSIS — E05.00 GRAVES' DISEASE: ICD-10-CM

## 2025-08-04 DIAGNOSIS — E66.01 MATERNAL MORBID OBESITY, ANTEPARTUM (HCC): ICD-10-CM

## 2025-08-04 DIAGNOSIS — O09.529 ADVANCED MATERNAL AGE IN MULTIGRAVIDA, UNSPECIFIED TRIMESTER: ICD-10-CM

## 2025-08-04 DIAGNOSIS — O09.529 ANTEPARTUM MULTIGRAVIDA OF ADVANCED MATERNAL AGE: ICD-10-CM

## 2025-08-04 DIAGNOSIS — Z86.19 HISTORY OF HERPES GENITALIS: ICD-10-CM

## 2025-08-04 DIAGNOSIS — O16.9 HYPERTENSION AFFECTING PREGNANCY, ANTEPARTUM: Primary | ICD-10-CM

## 2025-08-04 DIAGNOSIS — O99.210 MATERNAL MORBID OBESITY, ANTEPARTUM (HCC): ICD-10-CM

## 2025-08-04 LAB
GLUCOSE URINE, POC: NEGATIVE MG/DL
PROTEIN UA: NEGATIVE

## 2025-08-05 ENCOUNTER — ROUTINE PRENATAL (OUTPATIENT)
Age: 35
End: 2025-08-05

## 2025-08-05 VITALS
HEART RATE: 105 BPM | OXYGEN SATURATION: 97 % | DIASTOLIC BLOOD PRESSURE: 81 MMHG | WEIGHT: 271.5 LBS | SYSTOLIC BLOOD PRESSURE: 121 MMHG | BODY MASS INDEX: 46.6 KG/M2 | TEMPERATURE: 97.8 F

## 2025-08-05 DIAGNOSIS — O09.93 ENCOUNTER FOR SUPERVISION OF HIGH RISK PREGNANCY IN THIRD TRIMESTER, ANTEPARTUM: Primary | ICD-10-CM

## 2025-08-05 LAB
GLUCOSE URINE, POC: NEGATIVE MG/DL
PROTEIN UA: POSITIVE

## 2025-08-08 ENCOUNTER — TELEPHONE (OUTPATIENT)
Age: 35
End: 2025-08-08

## 2025-08-09 LAB
CULTURE: NORMAL
SPECIMEN DESCRIPTION: NORMAL

## 2025-08-18 ENCOUNTER — HOSPITAL ENCOUNTER (INPATIENT)
Age: 35
LOS: 1 days | Discharge: HOME | DRG: 833 | End: 2025-08-18
Attending: OBSTETRICS & GYNECOLOGY | Admitting: STUDENT IN AN ORGANIZED HEALTH CARE EDUCATION/TRAINING PROGRAM
Payer: COMMERCIAL

## 2025-08-18 ENCOUNTER — ROUTINE PRENATAL (OUTPATIENT)
Age: 35
End: 2025-08-18
Payer: COMMERCIAL

## 2025-08-18 VITALS
DIASTOLIC BLOOD PRESSURE: 70 MMHG | RESPIRATION RATE: 16 BRPM | TEMPERATURE: 98.2 F | SYSTOLIC BLOOD PRESSURE: 130 MMHG | HEART RATE: 85 BPM

## 2025-08-18 VITALS
TEMPERATURE: 97.7 F | WEIGHT: 275 LBS | HEART RATE: 84 BPM | SYSTOLIC BLOOD PRESSURE: 110 MMHG | RESPIRATION RATE: 12 BRPM | DIASTOLIC BLOOD PRESSURE: 78 MMHG | OXYGEN SATURATION: 98 % | BODY MASS INDEX: 47.2 KG/M2

## 2025-08-18 DIAGNOSIS — O16.9 HYPERTENSION AFFECTING PREGNANCY, ANTEPARTUM: ICD-10-CM

## 2025-08-18 DIAGNOSIS — O34.219 PREVIOUS CESAREAN SECTION COMPLICATING PREGNANCY, ANTEPARTUM CONDITION OR COMPLICATION: ICD-10-CM

## 2025-08-18 DIAGNOSIS — O46.93 VAGINAL BLEEDING IN PREGNANCY, THIRD TRIMESTER: ICD-10-CM

## 2025-08-18 DIAGNOSIS — O09.529 ANTEPARTUM MULTIGRAVIDA OF ADVANCED MATERNAL AGE: ICD-10-CM

## 2025-08-18 DIAGNOSIS — Z86.19 HISTORY OF HERPES GENITALIS: ICD-10-CM

## 2025-08-18 DIAGNOSIS — Z3A.37 37 WEEKS GESTATION OF PREGNANCY: ICD-10-CM

## 2025-08-18 DIAGNOSIS — E66.01 MATERNAL MORBID OBESITY, ANTEPARTUM (HCC): ICD-10-CM

## 2025-08-18 DIAGNOSIS — E05.00 GRAVES' DISEASE: ICD-10-CM

## 2025-08-18 DIAGNOSIS — O09.529 ADVANCED MATERNAL AGE IN MULTIGRAVIDA, UNSPECIFIED TRIMESTER: Primary | ICD-10-CM

## 2025-08-18 DIAGNOSIS — O99.210 MATERNAL MORBID OBESITY, ANTEPARTUM (HCC): ICD-10-CM

## 2025-08-18 LAB
GLUCOSE URINE, POC: NORMAL MG/DL
PROTEIN UA: NEGATIVE

## 2025-08-18 PROCEDURE — 99222 1ST HOSP IP/OBS MODERATE 55: CPT | Performed by: STUDENT IN AN ORGANIZED HEALTH CARE EDUCATION/TRAINING PROGRAM

## 2025-08-18 PROCEDURE — 81002 URINALYSIS NONAUTO W/O SCOPE: CPT | Performed by: OBSTETRICS & GYNECOLOGY

## 2025-08-18 PROCEDURE — 1220000000 HC SEMI PRIVATE OB R&B

## 2025-08-18 PROCEDURE — 99999 PR OFFICE/OUTPT VISIT,PROCEDURE ONLY: CPT | Performed by: OBSTETRICS & GYNECOLOGY

## 2025-08-18 RX ORDER — SODIUM CHLORIDE, SODIUM LACTATE, POTASSIUM CHLORIDE, AND CALCIUM CHLORIDE .6; .31; .03; .02 G/100ML; G/100ML; G/100ML; G/100ML
500 INJECTION, SOLUTION INTRAVENOUS ONCE
Status: DISCONTINUED | OUTPATIENT
Start: 2025-08-18 | End: 2025-08-18 | Stop reason: HOSPADM

## 2025-08-21 ENCOUNTER — ROUTINE PRENATAL (OUTPATIENT)
Age: 35
End: 2025-08-21

## 2025-08-21 VITALS
RESPIRATION RATE: 16 BRPM | SYSTOLIC BLOOD PRESSURE: 118 MMHG | DIASTOLIC BLOOD PRESSURE: 82 MMHG | WEIGHT: 277.4 LBS | OXYGEN SATURATION: 97 % | TEMPERATURE: 97.6 F | BODY MASS INDEX: 47.36 KG/M2 | HEART RATE: 102 BPM | HEIGHT: 64 IN

## 2025-08-21 DIAGNOSIS — Z3A.38 38 WEEKS GESTATION OF PREGNANCY: Primary | ICD-10-CM

## 2025-08-21 DIAGNOSIS — O09.529 ENCOUNTER FOR SUPERVISION OF HIGH-RISK PREGNANCY WITH MULTIGRAVIDA OF ADVANCED MATERNAL AGE: ICD-10-CM

## 2025-08-21 LAB
GLUCOSE URINE, POC: NEGATIVE MG/DL
PROTEIN UA: NEGATIVE

## 2025-08-25 ENCOUNTER — ROUTINE PRENATAL (OUTPATIENT)
Age: 35
End: 2025-08-25
Payer: COMMERCIAL

## 2025-08-25 VITALS
SYSTOLIC BLOOD PRESSURE: 108 MMHG | HEART RATE: 108 BPM | OXYGEN SATURATION: 98 % | BODY MASS INDEX: 47.89 KG/M2 | TEMPERATURE: 97.4 F | DIASTOLIC BLOOD PRESSURE: 77 MMHG | WEIGHT: 279 LBS

## 2025-08-25 DIAGNOSIS — O16.9 HYPERTENSION AFFECTING PREGNANCY, ANTEPARTUM: ICD-10-CM

## 2025-08-25 DIAGNOSIS — Z3A.38 38 WEEKS GESTATION OF PREGNANCY: ICD-10-CM

## 2025-08-25 DIAGNOSIS — O99.213 OBESITY AFFECTING PREGNANCY IN THIRD TRIMESTER, UNSPECIFIED OBESITY TYPE: ICD-10-CM

## 2025-08-25 DIAGNOSIS — O16.3 HYPERTENSION AFFECTING PREGNANCY IN THIRD TRIMESTER: Primary | ICD-10-CM

## 2025-08-25 DIAGNOSIS — O09.523 MULTIGRAVIDA OF ADVANCED MATERNAL AGE IN THIRD TRIMESTER: ICD-10-CM

## 2025-08-25 DIAGNOSIS — O09.529 ADVANCED MATERNAL AGE IN MULTIGRAVIDA, UNSPECIFIED TRIMESTER: ICD-10-CM

## 2025-08-25 LAB
GLUCOSE URINE, POC: NORMAL MG/DL
PROTEIN UA: NEGATIVE

## 2025-08-25 PROCEDURE — 81002 URINALYSIS NONAUTO W/O SCOPE: CPT | Performed by: OBSTETRICS & GYNECOLOGY

## 2025-08-27 ENCOUNTER — HOSPITAL ENCOUNTER (INPATIENT)
Age: 35
LOS: 2 days | Discharge: HOME | End: 2025-08-29
Attending: OBSTETRICS & GYNECOLOGY | Admitting: OBSTETRICS & GYNECOLOGY
Payer: COMMERCIAL

## 2025-08-27 ENCOUNTER — ANESTHESIA EVENT (OUTPATIENT)
Dept: LABOR AND DELIVERY | Age: 35
End: 2025-08-27
Payer: COMMERCIAL

## 2025-08-27 ENCOUNTER — ANESTHESIA (OUTPATIENT)
Dept: LABOR AND DELIVERY | Age: 35
End: 2025-08-27
Payer: COMMERCIAL

## 2025-08-27 DIAGNOSIS — G89.18 ACUTE POSTOPERATIVE ABDOMINAL PAIN: Primary | ICD-10-CM

## 2025-08-27 DIAGNOSIS — R10.9 ACUTE POSTOPERATIVE ABDOMINAL PAIN: Primary | ICD-10-CM

## 2025-08-27 PROBLEM — Z3A.39 39 WEEKS GESTATION OF PREGNANCY: Status: ACTIVE | Noted: 2025-08-27

## 2025-08-27 PROBLEM — O34.219 PREVIOUS CESAREAN SECTION COMPLICATING PREGNANCY: Status: ACTIVE | Noted: 2025-08-27

## 2025-08-27 LAB
ABO + RH BLD: NORMAL
AMPHET UR QL SCN: NEGATIVE
ARM BAND NUMBER: NORMAL
BARBITURATES UR QL SCN: NEGATIVE
BENZODIAZ UR QL: NEGATIVE
BLOOD BANK SAMPLE EXPIRATION: NORMAL
BLOOD GROUP ANTIBODIES SERPL: NEGATIVE
BUPRENORPHINE UR QL: NEGATIVE
CANNABINOIDS UR QL SCN: NEGATIVE
COCAINE UR QL SCN: NEGATIVE
ERYTHROCYTE [DISTWIDTH] IN BLOOD BY AUTOMATED COUNT: 13.2 % (ref 11.5–15)
FENTANYL UR QL: NEGATIVE
HCT VFR BLD AUTO: 35.8 % (ref 34–48)
HGB BLD-MCNC: 12.5 G/DL (ref 11.5–15.5)
MCH RBC QN AUTO: 32 PG (ref 26–35)
MCHC RBC AUTO-ENTMCNC: 34.9 G/DL (ref 32–34.5)
MCV RBC AUTO: 91.6 FL (ref 80–99.9)
METHADONE UR QL: NEGATIVE
OPIATES UR QL SCN: NEGATIVE
OXYCODONE UR QL SCN: NEGATIVE
PCP UR QL SCN: NEGATIVE
PLATELET # BLD AUTO: 209 K/UL (ref 130–450)
PMV BLD AUTO: 9.4 FL (ref 7–12)
RBC # BLD AUTO: 3.91 M/UL (ref 3.5–5.5)
TEST INFORMATION: NORMAL
WBC OTHER # BLD: 13.5 K/UL (ref 4.5–11.5)

## 2025-08-27 PROCEDURE — 59510 CESAREAN DELIVERY: CPT | Performed by: OBSTETRICS & GYNECOLOGY

## 2025-08-27 PROCEDURE — 6360000002 HC RX W HCPCS: Performed by: ANESTHESIOLOGY

## 2025-08-27 PROCEDURE — 2500000003 HC RX 250 WO HCPCS: Performed by: OBSTETRICS & GYNECOLOGY

## 2025-08-27 PROCEDURE — 86900 BLOOD TYPING SEROLOGIC ABO: CPT

## 2025-08-27 PROCEDURE — 6360000002 HC RX W HCPCS: Performed by: OBSTETRICS & GYNECOLOGY

## 2025-08-27 PROCEDURE — 7100000000 HC PACU RECOVERY - FIRST 15 MIN: Performed by: OBSTETRICS & GYNECOLOGY

## 2025-08-27 PROCEDURE — 85027 COMPLETE CBC AUTOMATED: CPT

## 2025-08-27 PROCEDURE — 2580000003 HC RX 258: Performed by: NURSE ANESTHETIST, CERTIFIED REGISTERED

## 2025-08-27 PROCEDURE — 3609079900 HC CESAREAN SECTION: Performed by: OBSTETRICS & GYNECOLOGY

## 2025-08-27 PROCEDURE — 59514 CESAREAN DELIVERY ONLY: CPT | Performed by: STUDENT IN AN ORGANIZED HEALTH CARE EDUCATION/TRAINING PROGRAM

## 2025-08-27 PROCEDURE — 3700000001 HC ADD 15 MINUTES (ANESTHESIA): Performed by: OBSTETRICS & GYNECOLOGY

## 2025-08-27 PROCEDURE — 86901 BLOOD TYPING SEROLOGIC RH(D): CPT

## 2025-08-27 PROCEDURE — 2709999900 HC NON-CHARGEABLE SUPPLY: Performed by: OBSTETRICS & GYNECOLOGY

## 2025-08-27 PROCEDURE — 7100000001 HC PACU RECOVERY - ADDTL 15 MIN: Performed by: OBSTETRICS & GYNECOLOGY

## 2025-08-27 PROCEDURE — 3700000000 HC ANESTHESIA ATTENDED CARE: Performed by: OBSTETRICS & GYNECOLOGY

## 2025-08-27 PROCEDURE — 86850 RBC ANTIBODY SCREEN: CPT

## 2025-08-27 PROCEDURE — 80307 DRUG TEST PRSMV CHEM ANLYZR: CPT

## 2025-08-27 PROCEDURE — 1220000000 HC SEMI PRIVATE OB R&B

## 2025-08-27 PROCEDURE — 2580000003 HC RX 258: Performed by: OBSTETRICS & GYNECOLOGY

## 2025-08-27 PROCEDURE — 6360000002 HC RX W HCPCS: Performed by: NURSE ANESTHETIST, CERTIFIED REGISTERED

## 2025-08-27 PROCEDURE — 6370000000 HC RX 637 (ALT 250 FOR IP): Performed by: OBSTETRICS & GYNECOLOGY

## 2025-08-27 RX ORDER — SODIUM CHLORIDE 0.9 % (FLUSH) 0.9 %
10 SYRINGE (ML) INJECTION PRN
Status: DISCONTINUED | OUTPATIENT
Start: 2025-08-27 | End: 2025-08-27 | Stop reason: SDUPTHER

## 2025-08-27 RX ORDER — DIPHENHYDRAMINE HYDROCHLORIDE 50 MG/ML
25 INJECTION, SOLUTION INTRAMUSCULAR; INTRAVENOUS EVERY 6 HOURS PRN
Status: ACTIVE | OUTPATIENT
Start: 2025-08-27 | End: 2025-08-28

## 2025-08-27 RX ORDER — DOCUSATE SODIUM 100 MG/1
100 CAPSULE, LIQUID FILLED ORAL 2 TIMES DAILY
Status: DISCONTINUED | OUTPATIENT
Start: 2025-08-27 | End: 2025-08-29 | Stop reason: HOSPADM

## 2025-08-27 RX ORDER — ACETAMINOPHEN 500 MG
1000 TABLET ORAL EVERY 8 HOURS
Status: DISCONTINUED | OUTPATIENT
Start: 2025-08-27 | End: 2025-08-29 | Stop reason: HOSPADM

## 2025-08-27 RX ORDER — BUPIVACAINE HYDROCHLORIDE 7.5 MG/ML
INJECTION, SOLUTION INTRASPINAL
Status: DISCONTINUED | OUTPATIENT
Start: 2025-08-27 | End: 2025-08-27 | Stop reason: SDUPTHER

## 2025-08-27 RX ORDER — SODIUM CHLORIDE, SODIUM LACTATE, POTASSIUM CHLORIDE, CALCIUM CHLORIDE 600; 310; 30; 20 MG/100ML; MG/100ML; MG/100ML; MG/100ML
INJECTION, SOLUTION INTRAVENOUS CONTINUOUS
Status: DISCONTINUED | OUTPATIENT
Start: 2025-08-27 | End: 2025-08-29 | Stop reason: HOSPADM

## 2025-08-27 RX ORDER — MODIFIED LANOLIN
OINTMENT (GRAM) TOPICAL
Status: DISCONTINUED | OUTPATIENT
Start: 2025-08-27 | End: 2025-08-29 | Stop reason: HOSPADM

## 2025-08-27 RX ORDER — ACETAMINOPHEN 325 MG/1
975 TABLET ORAL ONCE
Status: COMPLETED | OUTPATIENT
Start: 2025-08-27 | End: 2025-08-27

## 2025-08-27 RX ORDER — ONDANSETRON 2 MG/ML
4 INJECTION INTRAMUSCULAR; INTRAVENOUS EVERY 6 HOURS PRN
Status: DISCONTINUED | OUTPATIENT
Start: 2025-08-27 | End: 2025-08-28 | Stop reason: DRUGHIGH

## 2025-08-27 RX ORDER — LOPERAMIDE HYDROCHLORIDE 2 MG/1
2 CAPSULE ORAL PRN
Status: DISCONTINUED | OUTPATIENT
Start: 2025-08-27 | End: 2025-08-29 | Stop reason: HOSPADM

## 2025-08-27 RX ORDER — SIMETHICONE 80 MG
80 TABLET,CHEWABLE ORAL EVERY 6 HOURS PRN
Status: DISCONTINUED | OUTPATIENT
Start: 2025-08-27 | End: 2025-08-29 | Stop reason: HOSPADM

## 2025-08-27 RX ORDER — SODIUM CHLORIDE 9 MG/ML
INJECTION, SOLUTION INTRAVENOUS PRN
Status: DISCONTINUED | OUTPATIENT
Start: 2025-08-27 | End: 2025-08-27 | Stop reason: SDUPTHER

## 2025-08-27 RX ORDER — OXYCODONE HYDROCHLORIDE 5 MG/1
5 TABLET ORAL EVERY 4 HOURS PRN
Status: ACTIVE | OUTPATIENT
Start: 2025-08-27 | End: 2025-08-28

## 2025-08-27 RX ORDER — SODIUM CHLORIDE 0.9 % (FLUSH) 0.9 %
5-40 SYRINGE (ML) INJECTION EVERY 12 HOURS SCHEDULED
Status: DISCONTINUED | OUTPATIENT
Start: 2025-08-27 | End: 2025-08-29 | Stop reason: HOSPADM

## 2025-08-27 RX ORDER — DIPHENHYDRAMINE HCL 25 MG
25 TABLET ORAL EVERY 6 HOURS PRN
Status: ACTIVE | OUTPATIENT
Start: 2025-08-27 | End: 2025-08-28

## 2025-08-27 RX ORDER — OXYCODONE HYDROCHLORIDE 5 MG/1
10 TABLET ORAL EVERY 4 HOURS PRN
Status: ACTIVE | OUTPATIENT
Start: 2025-08-27 | End: 2025-08-28

## 2025-08-27 RX ORDER — SODIUM CHLORIDE, SODIUM LACTATE, POTASSIUM CHLORIDE, CALCIUM CHLORIDE 600; 310; 30; 20 MG/100ML; MG/100ML; MG/100ML; MG/100ML
INJECTION, SOLUTION INTRAVENOUS
Status: DISCONTINUED | OUTPATIENT
Start: 2025-08-27 | End: 2025-08-27 | Stop reason: SDUPTHER

## 2025-08-27 RX ORDER — ONDANSETRON 2 MG/ML
INJECTION INTRAMUSCULAR; INTRAVENOUS
Status: DISCONTINUED | OUTPATIENT
Start: 2025-08-27 | End: 2025-08-27 | Stop reason: SDUPTHER

## 2025-08-27 RX ORDER — MORPHINE SULFATE 1 MG/ML
INJECTION, SOLUTION EPIDURAL; INTRATHECAL; INTRAVENOUS
Status: DISCONTINUED | OUTPATIENT
Start: 2025-08-27 | End: 2025-08-27 | Stop reason: SDUPTHER

## 2025-08-27 RX ORDER — SODIUM CHLORIDE, SODIUM LACTATE, POTASSIUM CHLORIDE, AND CALCIUM CHLORIDE .6; .31; .03; .02 G/100ML; G/100ML; G/100ML; G/100ML
1000 INJECTION, SOLUTION INTRAVENOUS ONCE
Status: COMPLETED | OUTPATIENT
Start: 2025-08-27 | End: 2025-08-27

## 2025-08-27 RX ORDER — ACETAMINOPHEN 325 MG/1
650 TABLET ORAL EVERY 4 HOURS PRN
Status: ACTIVE | OUTPATIENT
Start: 2025-08-27 | End: 2025-08-28

## 2025-08-27 RX ORDER — PHENYLEPHRINE HCL IN 0.9% NACL 1 MG/10 ML
SYRINGE (ML) INTRAVENOUS
Status: DISCONTINUED | OUTPATIENT
Start: 2025-08-27 | End: 2025-08-27 | Stop reason: SDUPTHER

## 2025-08-27 RX ORDER — KETOROLAC TROMETHAMINE 30 MG/ML
30 INJECTION, SOLUTION INTRAMUSCULAR; INTRAVENOUS EVERY 6 HOURS PRN
Status: DISPENSED | OUTPATIENT
Start: 2025-08-27 | End: 2025-08-28

## 2025-08-27 RX ORDER — SODIUM CHLORIDE 0.9 % (FLUSH) 0.9 %
5-40 SYRINGE (ML) INJECTION PRN
Status: DISCONTINUED | OUTPATIENT
Start: 2025-08-27 | End: 2025-08-29 | Stop reason: HOSPADM

## 2025-08-27 RX ORDER — SODIUM CHLORIDE 0.9 % (FLUSH) 0.9 %
5-40 SYRINGE (ML) INJECTION EVERY 12 HOURS SCHEDULED
Status: DISCONTINUED | OUTPATIENT
Start: 2025-08-27 | End: 2025-08-27 | Stop reason: SDUPTHER

## 2025-08-27 RX ORDER — SODIUM CHLORIDE 9 MG/ML
INJECTION, SOLUTION INTRAVENOUS PRN
Status: DISCONTINUED | OUTPATIENT
Start: 2025-08-27 | End: 2025-08-29 | Stop reason: HOSPADM

## 2025-08-27 RX ADMIN — SODIUM CHLORIDE, POTASSIUM CHLORIDE, SODIUM LACTATE AND CALCIUM CHLORIDE: 600; 310; 30; 20 INJECTION, SOLUTION INTRAVENOUS at 12:16

## 2025-08-27 RX ADMIN — WATER 3000 MG: 1 INJECTION INTRAMUSCULAR; INTRAVENOUS; SUBCUTANEOUS at 12:11

## 2025-08-27 RX ADMIN — ACETAMINOPHEN 975 MG: 325 TABLET ORAL at 12:09

## 2025-08-27 RX ADMIN — Medication 200 MCG: at 12:33

## 2025-08-27 RX ADMIN — DOCUSATE SODIUM 100 MG: 100 CAPSULE, LIQUID FILLED ORAL at 20:13

## 2025-08-27 RX ADMIN — ONDANSETRON 8 MG: 2 INJECTION, SOLUTION INTRAMUSCULAR; INTRAVENOUS at 12:17

## 2025-08-27 RX ADMIN — AZITHROMYCIN MONOHYDRATE 500 MG: 500 INJECTION, POWDER, LYOPHILIZED, FOR SOLUTION INTRAVENOUS at 12:10

## 2025-08-27 RX ADMIN — SIMETHICONE 80 MG: 80 TABLET, CHEWABLE ORAL at 18:39

## 2025-08-27 RX ADMIN — Medication: at 18:38

## 2025-08-27 RX ADMIN — Medication 500 ML: at 12:47

## 2025-08-27 RX ADMIN — KETOROLAC TROMETHAMINE 30 MG: 30 INJECTION, SOLUTION INTRAMUSCULAR at 20:13

## 2025-08-27 RX ADMIN — Medication 200 MCG: at 12:26

## 2025-08-27 RX ADMIN — SIMETHICONE 80 MG: 80 TABLET, CHEWABLE ORAL at 20:13

## 2025-08-27 RX ADMIN — MORPHINE SULFATE 0.15 MG: 1 INJECTION, SOLUTION EPIDURAL; INTRATHECAL; INTRAVENOUS at 12:25

## 2025-08-27 RX ADMIN — SODIUM CHLORIDE, POTASSIUM CHLORIDE, SODIUM LACTATE AND CALCIUM CHLORIDE 1000 ML: 600; 310; 30; 20 INJECTION, SOLUTION INTRAVENOUS at 09:51

## 2025-08-27 RX ADMIN — ACETAMINOPHEN 1000 MG: 500 TABLET ORAL at 18:38

## 2025-08-27 RX ADMIN — FAMOTIDINE 20 MG: 10 INJECTION, SOLUTION INTRAVENOUS at 12:10

## 2025-08-27 RX ADMIN — KETOROLAC TROMETHAMINE 30 MG: 30 INJECTION, SOLUTION INTRAMUSCULAR at 14:17

## 2025-08-27 RX ADMIN — BUPIVACAINE HYDROCHLORIDE 1.6 ML: 7.5 INJECTION, SOLUTION SUBARACHNOID at 12:25

## 2025-08-27 ASSESSMENT — PAIN - FUNCTIONAL ASSESSMENT
PAIN_FUNCTIONAL_ASSESSMENT: 0-10
PAIN_FUNCTIONAL_ASSESSMENT: 0-10

## 2025-08-27 ASSESSMENT — PAIN SCALES - GENERAL
PAINLEVEL_OUTOF10: 4
PAINLEVEL_OUTOF10: 4

## 2025-08-27 ASSESSMENT — PAIN DESCRIPTION - LOCATION: LOCATION: ABDOMEN;INCISION

## 2025-08-27 ASSESSMENT — LIFESTYLE VARIABLES: SMOKING_STATUS: 0

## 2025-08-28 PROBLEM — Z98.891 S/P CESAREAN SECTION: Status: ACTIVE | Noted: 2025-08-27

## 2025-08-28 LAB
ERYTHROCYTE [DISTWIDTH] IN BLOOD BY AUTOMATED COUNT: 13.2 % (ref 11.5–15)
HCT VFR BLD AUTO: 31.7 % (ref 34–48)
HGB BLD-MCNC: 10.9 G/DL (ref 11.5–15.5)
MCH RBC QN AUTO: 32.4 PG (ref 26–35)
MCHC RBC AUTO-ENTMCNC: 34.4 G/DL (ref 32–34.5)
MCV RBC AUTO: 94.3 FL (ref 80–99.9)
PLATELET # BLD AUTO: 179 K/UL (ref 130–450)
PMV BLD AUTO: 9.6 FL (ref 7–12)
RBC # BLD AUTO: 3.36 M/UL (ref 3.5–5.5)
WBC OTHER # BLD: 12.1 K/UL (ref 4.5–11.5)

## 2025-08-28 PROCEDURE — 6360000002 HC RX W HCPCS: Performed by: ANESTHESIOLOGY

## 2025-08-28 PROCEDURE — 85027 COMPLETE CBC AUTOMATED: CPT

## 2025-08-28 PROCEDURE — 1220000000 HC SEMI PRIVATE OB R&B

## 2025-08-28 PROCEDURE — APPNB30 APP NON BILLABLE TIME 0-30 MINS: Performed by: PHYSICIAN ASSISTANT

## 2025-08-28 PROCEDURE — 6370000000 HC RX 637 (ALT 250 FOR IP): Performed by: OBSTETRICS & GYNECOLOGY

## 2025-08-28 RX ORDER — ONDANSETRON 2 MG/ML
4 INJECTION INTRAMUSCULAR; INTRAVENOUS EVERY 6 HOURS PRN
Status: DISCONTINUED | OUTPATIENT
Start: 2025-08-28 | End: 2025-08-29 | Stop reason: HOSPADM

## 2025-08-28 RX ORDER — OXYCODONE HYDROCHLORIDE 5 MG/1
10 TABLET ORAL EVERY 4 HOURS PRN
Status: DISCONTINUED | OUTPATIENT
Start: 2025-08-28 | End: 2025-08-29 | Stop reason: HOSPADM

## 2025-08-28 RX ORDER — KETOROLAC TROMETHAMINE 30 MG/ML
30 INJECTION, SOLUTION INTRAMUSCULAR; INTRAVENOUS EVERY 6 HOURS
Status: ACTIVE | OUTPATIENT
Start: 2025-08-28 | End: 2025-08-28

## 2025-08-28 RX ORDER — ONDANSETRON 4 MG/1
4 TABLET, ORALLY DISINTEGRATING ORAL EVERY 8 HOURS PRN
Status: DISCONTINUED | OUTPATIENT
Start: 2025-08-28 | End: 2025-08-29 | Stop reason: HOSPADM

## 2025-08-28 RX ORDER — OXYCODONE HYDROCHLORIDE 5 MG/1
5 TABLET ORAL EVERY 4 HOURS PRN
Status: DISCONTINUED | OUTPATIENT
Start: 2025-08-28 | End: 2025-08-29 | Stop reason: HOSPADM

## 2025-08-28 RX ORDER — IBUPROFEN 600 MG/1
600 TABLET, FILM COATED ORAL EVERY 6 HOURS PRN
Status: DISCONTINUED | OUTPATIENT
Start: 2025-08-28 | End: 2025-08-29 | Stop reason: HOSPADM

## 2025-08-28 RX ADMIN — ACETAMINOPHEN 1000 MG: 500 TABLET ORAL at 05:18

## 2025-08-28 RX ADMIN — ACETAMINOPHEN 1000 MG: 500 TABLET ORAL at 21:09

## 2025-08-28 RX ADMIN — DOCUSATE SODIUM 100 MG: 100 CAPSULE, LIQUID FILLED ORAL at 21:09

## 2025-08-28 RX ADMIN — SIMETHICONE 80 MG: 80 TABLET, CHEWABLE ORAL at 22:26

## 2025-08-28 RX ADMIN — IBUPROFEN 600 MG: 600 TABLET ORAL at 22:26

## 2025-08-28 RX ADMIN — ACETAMINOPHEN 1000 MG: 500 TABLET ORAL at 13:01

## 2025-08-28 RX ADMIN — IBUPROFEN 600 MG: 600 TABLET ORAL at 14:58

## 2025-08-28 RX ADMIN — OXYCODONE HYDROCHLORIDE 5 MG: 5 TABLET ORAL at 23:17

## 2025-08-28 RX ADMIN — KETOROLAC TROMETHAMINE 30 MG: 30 INJECTION, SOLUTION INTRAMUSCULAR at 02:45

## 2025-08-28 RX ADMIN — DOCUSATE SODIUM 100 MG: 100 CAPSULE, LIQUID FILLED ORAL at 07:46

## 2025-08-28 RX ADMIN — KETOROLAC TROMETHAMINE 30 MG: 30 INJECTION, SOLUTION INTRAMUSCULAR at 09:16

## 2025-08-28 ASSESSMENT — PAIN DESCRIPTION - DESCRIPTORS
DESCRIPTORS: DISCOMFORT;SORE
DESCRIPTORS: BURNING;DISCOMFORT;SORE
DESCRIPTORS: SORE;TENDER;BURNING
DESCRIPTORS: ACHING
DESCRIPTORS: DISCOMFORT;SORE
DESCRIPTORS: BURNING;DISCOMFORT;SORE
DESCRIPTORS: DISCOMFORT;SORE

## 2025-08-28 ASSESSMENT — PAIN SCALES - GENERAL
PAINLEVEL_OUTOF10: 7
PAINLEVEL_OUTOF10: 1
PAINLEVEL_OUTOF10: 6
PAINLEVEL_OUTOF10: 1
PAINLEVEL_OUTOF10: 7
PAINLEVEL_OUTOF10: 6
PAINLEVEL_OUTOF10: 1

## 2025-08-28 ASSESSMENT — PAIN - FUNCTIONAL ASSESSMENT
PAIN_FUNCTIONAL_ASSESSMENT: ACTIVITIES ARE NOT PREVENTED
PAIN_FUNCTIONAL_ASSESSMENT: 0-10
PAIN_FUNCTIONAL_ASSESSMENT: ACTIVITIES ARE NOT PREVENTED
PAIN_FUNCTIONAL_ASSESSMENT: 0-10
PAIN_FUNCTIONAL_ASSESSMENT: ACTIVITIES ARE NOT PREVENTED
PAIN_FUNCTIONAL_ASSESSMENT: 0-10
PAIN_FUNCTIONAL_ASSESSMENT: 0-10

## 2025-08-28 ASSESSMENT — PAIN DESCRIPTION - FREQUENCY
FREQUENCY: INTERMITTENT
FREQUENCY: INTERMITTENT

## 2025-08-28 ASSESSMENT — PAIN DESCRIPTION - LOCATION
LOCATION: ABDOMEN;INCISION
LOCATION: ABDOMEN

## 2025-08-28 ASSESSMENT — PAIN DESCRIPTION - ORIENTATION
ORIENTATION: LOWER

## 2025-08-28 ASSESSMENT — PAIN DESCRIPTION - PAIN TYPE
TYPE: ACUTE PAIN;SURGICAL PAIN
TYPE: ACUTE PAIN;SURGICAL PAIN

## 2025-08-28 ASSESSMENT — PAIN DESCRIPTION - ONSET
ONSET: GRADUAL
ONSET: GRADUAL

## 2025-08-29 VITALS
DIASTOLIC BLOOD PRESSURE: 75 MMHG | HEART RATE: 67 BPM | TEMPERATURE: 97.7 F | OXYGEN SATURATION: 97 % | SYSTOLIC BLOOD PRESSURE: 104 MMHG | RESPIRATION RATE: 16 BRPM

## 2025-08-29 PROCEDURE — 6370000000 HC RX 637 (ALT 250 FOR IP): Performed by: OBSTETRICS & GYNECOLOGY

## 2025-08-29 PROCEDURE — APPNB30 APP NON BILLABLE TIME 0-30 MINS: Performed by: PHYSICIAN ASSISTANT

## 2025-08-29 RX ORDER — IBUPROFEN 600 MG/1
600 TABLET, FILM COATED ORAL EVERY 6 HOURS PRN
Qty: 30 TABLET | Refills: 1 | Status: SHIPPED | OUTPATIENT
Start: 2025-08-29

## 2025-08-29 RX ORDER — OXYCODONE HYDROCHLORIDE 5 MG/1
5 TABLET ORAL EVERY 6 HOURS PRN
Qty: 12 TABLET | Refills: 0 | Status: SHIPPED | OUTPATIENT
Start: 2025-08-29 | End: 2025-09-01

## 2025-08-29 RX ORDER — PSEUDOEPHEDRINE HCL 30 MG
100 TABLET ORAL 2 TIMES DAILY
Qty: 20 CAPSULE | Refills: 0 | Status: SHIPPED | OUTPATIENT
Start: 2025-08-29

## 2025-08-29 RX ADMIN — OXYCODONE HYDROCHLORIDE 10 MG: 5 TABLET ORAL at 08:04

## 2025-08-29 RX ADMIN — OXYCODONE HYDROCHLORIDE 10 MG: 5 TABLET ORAL at 12:12

## 2025-08-29 RX ADMIN — IBUPROFEN 600 MG: 600 TABLET ORAL at 03:36

## 2025-08-29 RX ADMIN — DOCUSATE SODIUM 100 MG: 100 CAPSULE, LIQUID FILLED ORAL at 08:04

## 2025-08-29 RX ADMIN — OXYCODONE HYDROCHLORIDE 10 MG: 5 TABLET ORAL at 03:36

## 2025-08-29 RX ADMIN — SIMETHICONE 80 MG: 80 TABLET, CHEWABLE ORAL at 08:04

## 2025-08-29 RX ADMIN — ACETAMINOPHEN 1000 MG: 500 TABLET ORAL at 05:57

## 2025-08-29 RX ADMIN — IBUPROFEN 600 MG: 600 TABLET ORAL at 10:37

## 2025-08-29 ASSESSMENT — PAIN - FUNCTIONAL ASSESSMENT
PAIN_FUNCTIONAL_ASSESSMENT: 0-10
PAIN_FUNCTIONAL_ASSESSMENT: ACTIVITIES ARE NOT PREVENTED
PAIN_FUNCTIONAL_ASSESSMENT: ACTIVITIES ARE NOT PREVENTED
PAIN_FUNCTIONAL_ASSESSMENT: 0-10
PAIN_FUNCTIONAL_ASSESSMENT: ACTIVITIES ARE NOT PREVENTED
PAIN_FUNCTIONAL_ASSESSMENT: 0-10

## 2025-08-29 ASSESSMENT — PAIN DESCRIPTION - LOCATION
LOCATION: ABDOMEN

## 2025-08-29 ASSESSMENT — PAIN DESCRIPTION - DESCRIPTORS
DESCRIPTORS: DISCOMFORT;SORE

## 2025-08-29 ASSESSMENT — PAIN DESCRIPTION - ORIENTATION
ORIENTATION: LOWER

## 2025-08-29 ASSESSMENT — PAIN SCALES - GENERAL
PAINLEVEL_OUTOF10: 6
PAINLEVEL_OUTOF10: 7
PAINLEVEL_OUTOF10: 1

## 2025-09-04 ENCOUNTER — POSTPARTUM VISIT (OUTPATIENT)
Age: 35
End: 2025-09-04

## 2025-09-04 VITALS
HEART RATE: 77 BPM | DIASTOLIC BLOOD PRESSURE: 80 MMHG | BODY MASS INDEX: 44.89 KG/M2 | OXYGEN SATURATION: 96 % | WEIGHT: 261.5 LBS | SYSTOLIC BLOOD PRESSURE: 131 MMHG | TEMPERATURE: 97.6 F

## 2025-09-04 DIAGNOSIS — Z09 POSTOP CHECK: Primary | ICD-10-CM

## 2025-09-04 PROCEDURE — 99024 POSTOP FOLLOW-UP VISIT: CPT | Performed by: OBSTETRICS & GYNECOLOGY

## (undated) DEVICE — 34" SINGLE PATIENT USE HOVERMATT BREATHABLE: Brand: SINGLE PATIENT USE HOVERMATT

## (undated) DEVICE — GLOVE SURG SZ 65 THK91MIL LTX FREE SYN POLYISOPRENE

## (undated) DEVICE — BAG SPECIMEN BIOHAZARD 6IN X 9IN

## (undated) DEVICE — 1LYRTR 16FR10ML 100%SILI SNAP: Brand: MEDLINE INDUSTRIES, INC.

## (undated) DEVICE — SUTURE PLN GUT SZ 3-0 L27IN ABSRB YELLOWISH TAN L36MM CT-1 842H

## (undated) DEVICE — CONTAINER SPEC 64OZ POLYPR PATH SNAP LOK CAP W/ LID

## (undated) DEVICE — SYRINGE MED 10ML LUERLOCK TIP W/O SFTY DISP

## (undated) DEVICE — SUTURE VICRYL + SZ 0 L36IN ABSRB VLT L36MM CT-1 1/2 CIR VCP346H

## (undated) DEVICE — SUTURE STRATAFIX SYMMETRIC SZ 1 L18IN ABSRB VLT CT1 L36CM SXPP1A404

## (undated) DEVICE — SOLUTION IRRIG 500ML 0.9% SOD CHLO USP POUR PLAS BTL

## (undated) DEVICE — Device

## (undated) DEVICE — SMALL VISCERA RETAINER: Brand: VISCERA RETAINER, FISH

## (undated) DEVICE — Device: Brand: PORTEX

## (undated) DEVICE — CESAREAN BIRTH PACK: Brand: MEDLINE INDUSTRIES, INC.

## (undated) DEVICE — CONTAINER,SPEC,PNEUM TUBE,3OZ,STRL PATH: Brand: MEDLINE

## (undated) DEVICE — MAT TRNSF W34XL78IN NYL SGL PT

## (undated) DEVICE — SUTURE MONOCRYL + SZ 4-0 L18IN ABSRB UD L19MM PS-2 3/8 CIR MCP496G

## (undated) DEVICE — APPLICATOR PREP 26ML 0.7% IOD POVACRYLEX 74% ISO ALC ST

## (undated) DEVICE — WOUND RETRACTOR AND PROTECTOR: Brand: ALEXIS O WOUND PROTECTOR-RETRACTOR

## (undated) DEVICE — VACUETTE® TUBE 6 ML Z SERUM CLOT ACTIVATOR 13X100 RED CAP-BLACK RING, NON-RIDGED: Brand: VACUETTE

## (undated) DEVICE — MANIFOLD SUCT 4 PRT 2 CANSTR FLTR DISP NEPTUNE 2

## (undated) DEVICE — DOUBLE BASIN SET: Brand: MEDLINE INDUSTRIES, INC.

## (undated) DEVICE — STANDARD HYPODERMIC NEEDLE,ALUMINUM HUB: Brand: MONOJECT

## (undated) DEVICE — 4-PORT MANIFOLD: Brand: NEPTUNE 2